# Patient Record
Sex: MALE | Race: WHITE | NOT HISPANIC OR LATINO | URBAN - METROPOLITAN AREA
[De-identification: names, ages, dates, MRNs, and addresses within clinical notes are randomized per-mention and may not be internally consistent; named-entity substitution may affect disease eponyms.]

---

## 2017-01-25 ENCOUNTER — OUTPATIENT (OUTPATIENT)
Dept: OUTPATIENT SERVICES | Facility: HOSPITAL | Age: 72
LOS: 1 days | End: 2017-01-25
Payer: COMMERCIAL

## 2017-01-25 PROCEDURE — C1769: CPT

## 2017-01-25 PROCEDURE — 50435 EXCHANGE NEPHROSTOMY CATH: CPT | Mod: RT

## 2017-01-25 PROCEDURE — C1729: CPT

## 2017-01-25 PROCEDURE — 50435 EXCHANGE NEPHROSTOMY CATH: CPT

## 2017-06-07 ENCOUNTER — OUTPATIENT (OUTPATIENT)
Dept: OUTPATIENT SERVICES | Facility: HOSPITAL | Age: 72
LOS: 1 days | End: 2017-06-07
Payer: COMMERCIAL

## 2017-06-07 PROCEDURE — 50435 EXCHANGE NEPHROSTOMY CATH: CPT | Mod: RT

## 2017-06-07 PROCEDURE — C1729: CPT

## 2017-06-07 PROCEDURE — 50435 EXCHANGE NEPHROSTOMY CATH: CPT

## 2017-06-07 PROCEDURE — C1769: CPT

## 2017-08-30 ENCOUNTER — OUTPATIENT (OUTPATIENT)
Dept: OUTPATIENT SERVICES | Facility: HOSPITAL | Age: 72
LOS: 1 days | End: 2017-08-30
Payer: COMMERCIAL

## 2017-08-30 PROCEDURE — C1769: CPT

## 2017-08-30 PROCEDURE — 50435 EXCHANGE NEPHROSTOMY CATH: CPT | Mod: RT

## 2017-08-30 PROCEDURE — C1729: CPT

## 2017-08-30 PROCEDURE — 50432 PLMT NEPHROSTOMY CATHETER: CPT

## 2017-11-28 ENCOUNTER — OUTPATIENT (OUTPATIENT)
Dept: OUTPATIENT SERVICES | Facility: HOSPITAL | Age: 72
LOS: 1 days | End: 2017-11-28
Payer: COMMERCIAL

## 2017-11-28 PROCEDURE — C1769: CPT

## 2017-11-28 PROCEDURE — 50435 EXCHANGE NEPHROSTOMY CATH: CPT | Mod: RT

## 2017-11-28 PROCEDURE — 50435 EXCHANGE NEPHROSTOMY CATH: CPT

## 2017-11-28 PROCEDURE — C1729: CPT

## 2017-12-06 DIAGNOSIS — N13.5 CROSSING VESSEL AND STRICTURE OF URETER WITHOUT HYDRONEPHROSIS: ICD-10-CM

## 2017-12-06 DIAGNOSIS — Z43.6 ENCOUNTER FOR ATTENTION TO OTHER ARTIFICIAL OPENINGS OF URINARY TRACT: ICD-10-CM

## 2018-04-03 ENCOUNTER — FORM ENCOUNTER (OUTPATIENT)
Age: 73
End: 2018-04-03

## 2018-04-04 ENCOUNTER — OUTPATIENT (OUTPATIENT)
Dept: OUTPATIENT SERVICES | Facility: HOSPITAL | Age: 73
LOS: 1 days | End: 2018-04-04
Payer: COMMERCIAL

## 2018-04-04 ENCOUNTER — APPOINTMENT (OUTPATIENT)
Dept: INTERVENTIONAL RADIOLOGY/VASCULAR | Facility: HOSPITAL | Age: 73
End: 2018-04-04
Payer: COMMERCIAL

## 2018-04-04 DIAGNOSIS — N13.5 CROSSING VESSEL AND STRICTURE OF URETER WITHOUT HYDRONEPHROSIS: ICD-10-CM

## 2018-04-04 DIAGNOSIS — Z43.6 ENCOUNTER FOR ATTENTION TO OTHER ARTIFICIAL OPENINGS OF URINARY TRACT: ICD-10-CM

## 2018-04-04 PROCEDURE — C1769: CPT

## 2018-04-04 PROCEDURE — 50435 EXCHANGE NEPHROSTOMY CATH: CPT

## 2018-04-04 PROCEDURE — 50435 EXCHANGE NEPHROSTOMY CATH: CPT | Mod: 50

## 2018-04-04 PROCEDURE — C1729: CPT

## 2018-06-27 ENCOUNTER — APPOINTMENT (OUTPATIENT)
Dept: INTERVENTIONAL RADIOLOGY/VASCULAR | Facility: HOSPITAL | Age: 73
End: 2018-06-27
Payer: COMMERCIAL

## 2018-06-27 ENCOUNTER — OUTPATIENT (OUTPATIENT)
Dept: OUTPATIENT SERVICES | Facility: HOSPITAL | Age: 73
LOS: 1 days | End: 2018-06-27
Payer: COMMERCIAL

## 2018-06-27 DIAGNOSIS — N13.5 CROSSING VESSEL AND STRICTURE OF URETER WITHOUT HYDRONEPHROSIS: ICD-10-CM

## 2018-06-27 DIAGNOSIS — Z43.6 ENCOUNTER FOR ATTENTION TO OTHER ARTIFICIAL OPENINGS OF URINARY TRACT: ICD-10-CM

## 2018-06-27 PROCEDURE — C1769: CPT

## 2018-06-27 PROCEDURE — C1729: CPT

## 2018-06-27 PROCEDURE — 50435 EXCHANGE NEPHROSTOMY CATH: CPT | Mod: RT

## 2018-06-27 PROCEDURE — 50435 EXCHANGE NEPHROSTOMY CATH: CPT

## 2018-09-04 ENCOUNTER — APPOINTMENT (OUTPATIENT)
Dept: INTERVENTIONAL RADIOLOGY/VASCULAR | Facility: HOSPITAL | Age: 73
End: 2018-09-04

## 2018-11-14 ENCOUNTER — APPOINTMENT (OUTPATIENT)
Dept: INTERVENTIONAL RADIOLOGY/VASCULAR | Facility: HOSPITAL | Age: 73
End: 2018-11-14

## 2018-11-14 ENCOUNTER — OUTPATIENT (OUTPATIENT)
Dept: OUTPATIENT SERVICES | Facility: HOSPITAL | Age: 73
LOS: 1 days | End: 2018-11-14
Payer: COMMERCIAL

## 2018-11-14 PROCEDURE — 50435 EXCHANGE NEPHROSTOMY CATH: CPT

## 2018-11-14 PROCEDURE — C1769: CPT

## 2018-11-14 PROCEDURE — C1729: CPT

## 2018-11-14 PROCEDURE — 50434 CONVERT NEPHROSTOMY CATHETER: CPT | Mod: RT

## 2019-02-06 ENCOUNTER — APPOINTMENT (OUTPATIENT)
Dept: INTERVENTIONAL RADIOLOGY/VASCULAR | Facility: HOSPITAL | Age: 74
End: 2019-02-06

## 2023-12-20 ENCOUNTER — HOSPITAL ENCOUNTER (INPATIENT)
Facility: HOSPITAL | Age: 78
LOS: 2 days | Discharge: HOME | DRG: 689 | End: 2023-12-23
Attending: EMERGENCY MEDICINE | Admitting: HOSPITALIST
Payer: COMMERCIAL

## 2023-12-20 ENCOUNTER — APPOINTMENT (EMERGENCY)
Dept: RADIOLOGY | Facility: HOSPITAL | Age: 78
DRG: 689 | End: 2023-12-20
Attending: EMERGENCY MEDICINE
Payer: COMMERCIAL

## 2023-12-20 DIAGNOSIS — U07.1 COVID-19: ICD-10-CM

## 2023-12-20 DIAGNOSIS — N39.0 LOWER URINARY TRACT INFECTION: Primary | ICD-10-CM

## 2023-12-20 PROBLEM — E11.9 TYPE 2 DIABETES MELLITUS WITHOUT COMPLICATION (CMS/HCC): Status: ACTIVE | Noted: 2023-12-20

## 2023-12-20 PROBLEM — C64.9 MALIGNANT NEOPLASM OF KIDNEY EXCLUDING RENAL PELVIS (CMS/HCC): Status: ACTIVE | Noted: 2023-12-20

## 2023-12-20 PROBLEM — E78.2 MIXED HYPERLIPIDEMIA: Status: ACTIVE | Noted: 2023-12-20

## 2023-12-20 LAB
ALBUMIN SERPL-MCNC: 3.9 G/DL (ref 3.5–5.7)
ALP SERPL-CCNC: 121 IU/L (ref 34–125)
ALT SERPL-CCNC: 9 IU/L (ref 7–52)
ANION GAP SERPL CALC-SCNC: 14 MEQ/L (ref 3–15)
AST SERPL-CCNC: 12 IU/L (ref 13–39)
ATRIAL RATE: 91
BACTERIA URNS QL MICRO: 3 /HPF
BASE EXCESS BLDV CALC-SCNC: -6.4 MEQ/L
BASOPHILS # BLD: 0.02 K/UL (ref 0.01–0.1)
BASOPHILS NFR BLD: 0.2 %
BILIRUB SERPL-MCNC: 0.8 MG/DL (ref 0.3–1.2)
BILIRUB UR QL STRIP.AUTO: NEGATIVE MG/DL
BUN SERPL-MCNC: 34 MG/DL (ref 7–25)
CA-I BLD-SCNC: 1.08 MMOL/L (ref 1.15–1.27)
CALCIUM SERPL-MCNC: 9.1 MG/DL (ref 8.6–10.3)
CHLORIDE BLDV-SCNC: 109 MEQ/L (ref 98–109)
CHLORIDE SERPL-SCNC: 104 MEQ/L (ref 98–107)
CLARITY UR REFRACT.AUTO: ABNORMAL
CO2 BLDV-SCNC: 20.2 MEQ/L (ref 22–32)
CO2 SERPL-SCNC: 20 MEQ/L (ref 21–31)
COHGB MFR BLD: 0.9 %
COLOR UR AUTO: ABNORMAL
CREAT SERPL-MCNC: 2.1 MG/DL (ref 0.7–1.3)
DIFFERENTIAL METHOD BLD: ABNORMAL
EGFRCR SERPLBLD CKD-EPI 2021: 31.4 ML/MIN/1.73M*2
EOSINOPHIL # BLD: 0.02 K/UL (ref 0.04–0.54)
EOSINOPHIL NFR BLD: 0.2 %
ERYTHROCYTE [DISTWIDTH] IN BLOOD BY AUTOMATED COUNT: 13.6 % (ref 11.6–14.4)
FIO2 ON VENT: ABNORMAL %
FLUAV RNA SPEC QL NAA+PROBE: NEGATIVE
FLUBV RNA SPEC QL NAA+PROBE: NEGATIVE
GLUCOSE BLD-MCNC: 131 MG/DL (ref 70–99)
GLUCOSE BLD-MCNC: 134 MG/DL (ref 70–99)
GLUCOSE BLD-MCNC: 169 MG/DL (ref 70–99)
GLUCOSE BLDV-MCNC: 150 MG/DL (ref 70–99)
GLUCOSE SERPL-MCNC: 181 MG/DL (ref 70–99)
GLUCOSE UR STRIP.AUTO-MCNC: NEGATIVE MG/DL
HCO3 BLDV-SCNC: 18.8 MEQ/L (ref 21–28)
HCT VFR BLDCO AUTO: 38.8 % (ref 40.1–51)
HGB BLD-MCNC: 12.2 G/DL (ref 13.7–17.5)
HGB BLDV-MCNC: 10.5 G/DL (ref 14–17.5)
HGB UR QL STRIP.AUTO: 1
HYALINE CASTS #/AREA URNS LPF: ABNORMAL /LPF
IMM GRANULOCYTES # BLD AUTO: 0.03 K/UL (ref 0–0.08)
IMM GRANULOCYTES NFR BLD AUTO: 0.4 %
INHALED O2 CONCENTRATION: ABNORMAL %
KETONES UR STRIP.AUTO-MCNC: NEGATIVE MG/DL
LACTATE SERPL-SCNC: 1.8 MMOL/L (ref 0.4–2)
LEUKOCYTE ESTERASE UR QL STRIP.AUTO: 3
LYMPHOCYTES # BLD: 0.44 K/UL (ref 1.2–3.5)
LYMPHOCYTES NFR BLD: 5.5 %
MCH RBC QN AUTO: 27 PG (ref 28–33.2)
MCHC RBC AUTO-ENTMCNC: 31.4 G/DL (ref 32.2–36.5)
MCV RBC AUTO: 85.8 FL (ref 83–98)
METHGB BLD-SCNC: 0 % (ref 0.4–1.5)
MONOCYTES # BLD: 0.79 K/UL (ref 0.3–1)
MONOCYTES NFR BLD: 9.8 %
NEUTROPHILS # BLD: 6.74 K/UL (ref 1.7–7)
NEUTS SEG NFR BLD: 83.9 %
NITRITE UR QL STRIP.AUTO: NEGATIVE
NRBC BLD-RTO: 0 %
P AXIS: 28
PCO2 BLDV: 37 MM HG (ref 41–51)
PDW BLD AUTO: 9.5 FL (ref 9.4–12.4)
PH BLDV: 7.32 [PH] (ref 7.32–7.42)
PH UR STRIP.AUTO: 6 [PH]
PLATELET # BLD AUTO: 211 K/UL (ref 150–350)
PO2 BLDV: 38 MM HG (ref 25–40)
POCT TEST: ABNORMAL
POTASSIUM BLDV-SCNC: 5.1 MEQ/L (ref 3.4–4.5)
POTASSIUM SERPL-SCNC: 4.3 MEQ/L (ref 3.5–5.1)
PR INTERVAL: 116
PROT SERPL-MCNC: 7.2 G/DL (ref 6–8.2)
PROT UR QL STRIP.AUTO: 1
QRS DURATION: 86
QT INTERVAL: 380
QTC CALCULATION(BAZETT): 467
R AXIS: 62
RBC # BLD AUTO: 4.52 M/UL (ref 4.5–5.8)
RBC #/AREA URNS HPF: ABNORMAL /HPF
RSV RNA SPEC QL NAA+PROBE: NEGATIVE
SAO2 % BLDV: 43 % (ref 30–60)
SARS-COV-2 RNA RESP QL NAA+PROBE: POSITIVE
SODIUM BLDV-SCNC: 137 MEQ/L (ref 136–145)
SODIUM SERPL-SCNC: 138 MEQ/L (ref 136–145)
SP GR UR REFRACT.AUTO: 1.01
SQUAMOUS URNS QL MICRO: ABNORMAL /HPF
T WAVE AXIS: 107
TROPONIN I SERPL HS-MCNC: 40.2 PG/ML
TROPONIN I SERPL HS-MCNC: 58.6 PG/ML
UROBILINOGEN UR STRIP-ACNC: 0.2 EU/DL
VENTRICULAR RATE: 91
WBC # BLD AUTO: 8.04 K/UL (ref 3.8–10.5)
WBC #/AREA URNS HPF: ABNORMAL /HPF

## 2023-12-20 PROCEDURE — 84484 ASSAY OF TROPONIN QUANT: CPT | Mod: 91

## 2023-12-20 PROCEDURE — 87040 BLOOD CULTURE FOR BACTERIA: CPT | Mod: 91 | Performed by: EMERGENCY MEDICINE

## 2023-12-20 PROCEDURE — 63600000 HC DRUGS/DETAIL CODE: Mod: JZ | Performed by: EMERGENCY MEDICINE

## 2023-12-20 PROCEDURE — G0378 HOSPITAL OBSERVATION PER HR: HCPCS

## 2023-12-20 PROCEDURE — 82803 BLOOD GASES ANY COMBINATION: CPT | Performed by: EMERGENCY MEDICINE

## 2023-12-20 PROCEDURE — 84484 ASSAY OF TROPONIN QUANT: CPT

## 2023-12-20 PROCEDURE — 80053 COMPREHEN METABOLIC PANEL: CPT

## 2023-12-20 PROCEDURE — 36415 COLL VENOUS BLD VENIPUNCTURE: CPT

## 2023-12-20 PROCEDURE — 71250 CT THORAX DX C-: CPT | Mod: MG

## 2023-12-20 PROCEDURE — 81001 URINALYSIS AUTO W/SCOPE: CPT

## 2023-12-20 PROCEDURE — 63700000 HC SELF-ADMINISTRABLE DRUG: Performed by: HOSPITALIST

## 2023-12-20 PROCEDURE — 85025 COMPLETE CBC W/AUTO DIFF WBC: CPT

## 2023-12-20 PROCEDURE — 87086 URINE CULTURE/COLONY COUNT: CPT

## 2023-12-20 PROCEDURE — 93010 ELECTROCARDIOGRAM REPORT: CPT | Performed by: INTERNAL MEDICINE

## 2023-12-20 PROCEDURE — 74176 CT ABD & PELVIS W/O CONTRAST: CPT | Mod: ME

## 2023-12-20 PROCEDURE — 87637 SARSCOV2&INF A&B&RSV AMP PRB: CPT | Performed by: EMERGENCY MEDICINE

## 2023-12-20 PROCEDURE — 99285 EMERGENCY DEPT VISIT HI MDM: CPT | Mod: 25

## 2023-12-20 PROCEDURE — 25800000 HC PHARMACY IV SOLUTIONS: Performed by: EMERGENCY MEDICINE

## 2023-12-20 PROCEDURE — 99223 1ST HOSP IP/OBS HIGH 75: CPT | Performed by: INTERNAL MEDICINE

## 2023-12-20 PROCEDURE — 96365 THER/PROPH/DIAG IV INF INIT: CPT

## 2023-12-20 PROCEDURE — 93005 ELECTROCARDIOGRAM TRACING: CPT

## 2023-12-20 RX ORDER — METFORMIN HYDROCHLORIDE 1000 MG/1
1000 TABLET ORAL 2 TIMES DAILY WITH MEALS
COMMUNITY

## 2023-12-20 RX ORDER — ASPIRIN 81 MG/1
81 TABLET ORAL DAILY
Status: DISCONTINUED | OUTPATIENT
Start: 2023-12-21 | End: 2023-12-23 | Stop reason: HOSPADM

## 2023-12-20 RX ORDER — ASPIRIN 81 MG/1
81 TABLET ORAL DAILY
COMMUNITY

## 2023-12-20 RX ORDER — CLOPIDOGREL BISULFATE 75 MG/1
75 TABLET ORAL DAILY
COMMUNITY

## 2023-12-20 RX ORDER — ONDANSETRON HYDROCHLORIDE 2 MG/ML
4 INJECTION, SOLUTION INTRAVENOUS EVERY 6 HOURS PRN
Status: DISCONTINUED | OUTPATIENT
Start: 2023-12-20 | End: 2023-12-23 | Stop reason: HOSPADM

## 2023-12-20 RX ORDER — SILODOSIN 8 MG/1
8 CAPSULE ORAL EVERY EVENING
COMMUNITY

## 2023-12-20 RX ORDER — CLOPIDOGREL BISULFATE 75 MG/1
75 TABLET ORAL DAILY
Status: DISCONTINUED | OUTPATIENT
Start: 2023-12-20 | End: 2023-12-23 | Stop reason: HOSPADM

## 2023-12-20 RX ORDER — ATORVASTATIN CALCIUM 20 MG/1
20 TABLET, FILM COATED ORAL
Status: DISCONTINUED | OUTPATIENT
Start: 2023-12-20 | End: 2023-12-23 | Stop reason: HOSPADM

## 2023-12-20 RX ORDER — DEXTROSE 40 %
15-30 GEL (GRAM) ORAL AS NEEDED
Status: DISCONTINUED | OUTPATIENT
Start: 2023-12-20 | End: 2023-12-23 | Stop reason: HOSPADM

## 2023-12-20 RX ORDER — ATORVASTATIN CALCIUM 40 MG/1
20 TABLET, FILM COATED ORAL NIGHTLY
COMMUNITY
End: 2023-12-23 | Stop reason: HOSPADM

## 2023-12-20 RX ORDER — DEXTROSE 50 % IN WATER (D50W) INTRAVENOUS SYRINGE
25 AS NEEDED
Status: DISCONTINUED | OUTPATIENT
Start: 2023-12-20 | End: 2023-12-23 | Stop reason: HOSPADM

## 2023-12-20 RX ORDER — INSULIN LISPRO 100 [IU]/ML
3-5 INJECTION, SOLUTION INTRAVENOUS; SUBCUTANEOUS
Status: DISCONTINUED | OUTPATIENT
Start: 2023-12-20 | End: 2023-12-23 | Stop reason: HOSPADM

## 2023-12-20 RX ORDER — ACETAMINOPHEN 325 MG/1
650 TABLET ORAL EVERY 4 HOURS PRN
Status: DISCONTINUED | OUTPATIENT
Start: 2023-12-20 | End: 2023-12-23 | Stop reason: HOSPADM

## 2023-12-20 RX ORDER — ENOXAPARIN SODIUM 100 MG/ML
40 INJECTION SUBCUTANEOUS
Status: DISCONTINUED | OUTPATIENT
Start: 2023-12-20 | End: 2023-12-21

## 2023-12-20 RX ORDER — GLYBURIDE 5 MG/1
10 TABLET ORAL 2 TIMES DAILY WITH MEALS
COMMUNITY

## 2023-12-20 RX ORDER — IBUPROFEN 200 MG
16-32 TABLET ORAL AS NEEDED
Status: DISCONTINUED | OUTPATIENT
Start: 2023-12-20 | End: 2023-12-23 | Stop reason: HOSPADM

## 2023-12-20 RX ADMIN — ATORVASTATIN CALCIUM 20 MG: 20 TABLET, FILM COATED ORAL at 19:09

## 2023-12-20 RX ADMIN — SODIUM CHLORIDE 1000 ML: 9 INJECTION, SOLUTION INTRAVENOUS at 15:30

## 2023-12-20 RX ADMIN — CLOPIDOGREL 75 MG: 75 TABLET ORAL at 19:09

## 2023-12-20 RX ADMIN — CEFTRIAXONE SODIUM 1 G: 1 INJECTION, POWDER, FOR SOLUTION INTRAMUSCULAR; INTRAVENOUS at 15:30

## 2023-12-20 ASSESSMENT — ENCOUNTER SYMPTOMS
SHORTNESS OF BREATH: 0
FEVER: 0

## 2023-12-20 NOTE — ED PROVIDER NOTES
Emergency Medicine Note  HPI   HISTORY OF PRESENT ILLNESS       History provided by:  Patient and spouse  Illness  Location:  Mild HA, nausea, cough, vomited once and weakness  Severity:  Moderate  Onset quality:  Gradual  Duration:  1 day  Timing:  Intermittent  Progression:  Unchanged  Chronicity:  New  Relieved by:  Nothing  Worsened by:  Nothing  Associated symptoms: no chest pain, no fever and no shortness of breath          Patient History   PAST HISTORY     Reviewed from Nursing Triage:       No past medical history on file.    No past surgical history on file.    No family history on file.           Review of Systems   REVIEW OF SYSTEMS     Review of Systems   Constitutional: Negative for fever.   Respiratory: Negative for shortness of breath.    Cardiovascular: Negative for chest pain.         VITALS     ED Vitals    None                      Physical Exam   PHYSICAL EXAM     Physical Exam  Vitals and nursing note reviewed.   Constitutional:       Appearance: He is well-developed.   HENT:      Head: Normocephalic and atraumatic.   Eyes:      General: No scleral icterus.     Conjunctiva/sclera: Conjunctivae normal.   Neck:      Vascular: No JVD.   Cardiovascular:      Rate and Rhythm: Normal rate and regular rhythm.   Pulmonary:      Effort: Pulmonary effort is normal. No respiratory distress.      Breath sounds: Normal breath sounds.   Abdominal:      Palpations: Abdomen is soft.      Tenderness: There is no abdominal tenderness.   Musculoskeletal:         General: No tenderness.      Cervical back: Neck supple.   Skin:     General: Skin is warm and dry.   Neurological:      Mental Status: He is alert.   Psychiatric:         Mood and Affect: Mood normal.         Behavior: Behavior normal.           PROCEDURES     Critical Care    Performed by: Cirilo Rivera MD  Authorized by: Cirilo Rivera MD    Critical care provider statement:     Critical care time (minutes):  35    Critical care time was  exclusive of:  Separately billable procedures and treating other patients    Critical care was time spent personally by me on the following activities:  Development of treatment plan with patient or surrogate, evaluation of patient's response to treatment, examination of patient, ordering and performing treatments and interventions, ordering and review of laboratory studies, ordering and review of radiographic studies, pulse oximetry, re-evaluation of patient's condition and review of old charts         DATA     Results     None          Imaging Results    None         No orders to display       Scoring tools                                  ED Course & MDM   MDM / ED COURSE / CLINICAL IMPRESSION / DISPO     Medical Decision Making  79yo male h/o DM, HTN, kidney CA s/p nephrectomy, CVA and prostate CA (on Lupron) brought in by wife for evaluation.  Last night, patient had mild headache cough and felt weak.  This morning, patient had tea and vomited once.  Wife reports decreased p.o. intake recently.  States last time patient had similar symptoms he had a urine infection.  Patient denies any urinary complaints currently.  Will check labs, COVID/flu, CXR, UA and reassess.    COVID-19: acute illness or injury  Lower urinary tract infection: acute illness or injury  Amount and/or Complexity of Data Reviewed  Independent Historian: spouse  External Data Reviewed: notes.     Details: 6/8/2022 visit for SAH s/p fall  [facility]    Labs: ordered. Decision-making details documented in ED Course.  Radiology: ordered and independent interpretation performed.  ECG/medicine tests: ordered and independent interpretation performed.     Details: NSR, nl axis, nonspecific ST-T changes      Risk  Decision regarding hospitalization.          ED Course as of 12/20/23 1543   Wed Dec 20, 2023   1444 UA concerning for UTI [MADDY]   1511 Cr 2.1 (last 1.45) [MADDY]   1533 Covid positive [MADDY]   1543 Case d/w Dr. Wall who will admit  [MADDY]      ED Course User Index  [MADDY] Cirilo Rivera MD     Clinical Impression      None               Cirilo Rivera MD  12/20/23 1546       Cirilo Rivera MD  12/20/23 1081

## 2023-12-20 NOTE — ASSESSMENT & PLAN NOTE
Patient with mild symptoms of nausea and lower extremity weakness. Incidentally found to be COVID-positive on admission. Continue isolation precautions per current guidelines.

## 2023-12-20 NOTE — PROGRESS NOTES
Spoke with patient's spouse and confirmed with medication list from [facility] and/or patient's own pharmacy to complete the medication reconciliation.     Prior to admission medication list:    Current Outpatient Medications:   •  aspirin 81 mg enteric coated tablet, Take 81 mg by mouth daily.  •  atorvastatin (LIPITOR) 40 mg tablet, Take 20 mg by mouth nightly.  •  clopidogreL (PLAVIX) 75 mg tablet, Take 75 mg by mouth daily.  •  glyBURIDE (DIABETA) 5 mg tablet, Take 10 mg by mouth 2 (two) times a day with meals.  •  metFORMIN (GLUCOPHAGE) 1,000 mg tablet, Take 1,000 mg by mouth 2 (two) times a day with meals.  •  silodosin (RAPAFLO) 8 mg capsule, Take 8 mg by mouth every evening.    Comments about home medications:  -Patient's spouse states patient is not taking Vitamin D2.    Compliance:   -Consistent fills, no compliance concerns based on patient interview

## 2023-12-20 NOTE — ASSESSMENT & PLAN NOTE
UA was generally positive with +3 bacteia. Ceftriaxone started in the ED and will continue 1 g daily. CT of the abdomen pelvis shows solitary right kidney with hydronephrosis.  Admitting team reviewed his nephrogram from March and findings were similar.  By report at that time he had hydronephrosis and 2 nonobstructing stones.  Stones on this imaging were absent. Treated with CTX while inpatient. Will DC on cepodoxime to complete 10d course.

## 2023-12-20 NOTE — H&P
Hospital Medicine Service -  History & Physical        CHIEF COMPLAINT   Altered mental status     HISTORY OF PRESENT ILLNESS      John Valle is a 78 y.o. male with a past medical history of multiple CVA, SDH, DM 2, HTN, renal CA s/p left nephrectomy, and prostate CA who presents with change in mental status.  Patient and his wife are visiting from [place] which she noticed patient having difficulty ambulating and more confused on Sunday.  At that time patient felt some chills with questionable fevers.  They noticed increasing weakness on Monday and Tuesday and an episode of vomiting today with headache.  Patient is generally continent and wife noticed possible increase in frequency and changing depends garments.  Patient is currently oriented x 2 and was unable to provide history.  HPI was obtained by patient's wife, Cori, who is bedside for exam.  Patient currently denies any abdominal pain, dysuria, frequency, dizziness, or lightheadedness.  Denies any chest pain or shortness of breath.  Does not appear to be hypoxic and denies orthopnea.  Has not noticed any change in bowel habits.  Vomitus this a.m. was negative for blood or coffee-ground emesis.    Upon exam in the ED, patient is confused but pleasantly interactive.  BP is slightly elevated to 168/70.  He is currently afebrile.  CT of the chest abdomen pelvis was obtained which showed hydronephrosis of his remaining right kidney with possible scarring.  I reviewed imaging report from nephrogram patient obtained in March of this year which showed similar findings of hydronephrosis which were considered surgical changes.    Patient denies any smoking history, reports rare alcohol use, denies illicit drug use.  Confirmed CODE STATUS the patient is full code.    PAST MEDICAL AND SURGICAL HISTORY      No past medical history on file.    No past surgical history on file.    PCP: Chaz Maria MD    MEDICATIONS      Prior to Admission  medications    Not on File       ALLERGIES      Lisinopril    FAMILY HISTORY      No family history on file.    SOCIAL HISTORY      Social History     Socioeconomic History   • Marital status:      Social Determinants of Health     Food Insecurity: No Food Insecurity (12/20/2023)    Hunger Vital Sign    • Worried About Running Out of Food in the Last Year: Never true    • Ran Out of Food in the Last Year: Never true       REVIEW OF SYSTEMS      All other systems reviewed and negative except as noted in HPI    PHYSICAL EXAMINATION      Temp:  [36.8 °C (98.3 °F)] 36.8 °C (98.3 °F)  Heart Rate:  [82-91] 82  Resp:  [20-22] 20  BP: (158-199)/(73-83) 162/74  There is no height or weight on file to calculate BMI.    Physical Exam  Vitals and nursing note reviewed.   Constitutional:       General: He is awake.      Appearance: Normal appearance. He is well-developed and normal weight.   HENT:      Head: Normocephalic and atraumatic.      Mouth/Throat:      Mouth: Mucous membranes are moist.      Pharynx: Oropharynx is clear.   Eyes:      Extraocular Movements: Extraocular movements intact.      Conjunctiva/sclera: Conjunctivae normal.      Pupils: Pupils are equal, round, and reactive to light.   Cardiovascular:      Rate and Rhythm: Normal rate and regular rhythm.      Pulses: Normal pulses.      Heart sounds: Normal heart sounds, S1 normal and S2 normal.   Pulmonary:      Effort: Pulmonary effort is normal. No respiratory distress.      Breath sounds: Normal breath sounds.   Abdominal:      General: Abdomen is flat. Bowel sounds are normal.      Palpations: Abdomen is soft.      Tenderness: There is no abdominal tenderness.   Musculoskeletal:      Cervical back: Normal range of motion.   Neurological:      Mental Status: He is alert and easily aroused. He is disoriented.      GCS: GCS eye subscore is 4. GCS verbal subscore is 5. GCS motor subscore is 6.      Motor: Weakness present.   Psychiatric:         Attention  and Perception: Attention and perception normal.         Mood and Affect: Mood and affect normal.         Speech: Speech normal.         Behavior: Behavior normal. Behavior is cooperative.         Thought Content: Thought content normal.         Cognition and Memory: Cognition normal. Memory is impaired.         Judgment: Judgment normal.         LABS / IMAGING / EKG        Labs  Results from last 7 days   Lab Units 12/20/23  1359   SODIUM mEQ/L 138   POTASSIUM mEQ/L 4.3   CHLORIDE mEQ/L 104   CO2 mEQ/L 20*   BUN mg/dL 34*   CREATININE mg/dL 2.1*   CALCIUM mg/dL 9.1   ALBUMIN g/dL 3.9   BILIRUBIN TOTAL mg/dL 0.8   ALK PHOS IU/L 121   ALT IU/L 9   AST IU/L 12*   GLUCOSE mg/dL 181*     Results from last 7 days   Lab Units 12/20/23  1359   WBC K/uL 8.04   HEMOGLOBIN g/dL 12.2*   HEMATOCRIT % 38.8*   PLATELETS K/uL 211         Imaging  CT CHEST WITHOUT IV CONTRAST, CT ABDOMEN PELVIS WITHOUT IV CONTRAST    Result Date: 12/20/2023  Narrative: EXAM: CT CHEST WO IV CONTRAST, CT ABDOMEN PELVIS WO IV CONTRAST CLINICAL HISTORY: Cough, persistent. Flank pain. Kidney stone suspected. Streaky of solitary kidney. COMPARISON: None TECHNIQUE: CT chest, abdomen and pelvis without contrast was performed with the patient in the supine position. Images reconstructed/reformatted in the axial, coronal and  sagittal planes. Oral contrast was not administered. CT DOSE:  One or more dose reduction techniques (e.g. automated exposure control, adjustment of the mA and/or kV according to patient size, use of iterative reconstruction technique) utilized for this examination. . CONTRAST: None COMMENT: The lack of intravenous contrast limits interpretation of the solid organs, vessels and certain processes. CHEST AIRWAYS, LUNGS AND PLEURA: Patent central airways. Scattered areas of endobronchial mucous plugging in the peripheral airways, such as in the middle lobe on series 2 image 207 and right lower lobe on series 2 image 193. Mild biapical  pleural parenchymal scarring. Consolidation. No pleural effusion or pneumothorax. Minimal subsegmental atelectasis at the lung bases. CARDIOMEDIASTINUM: Normal heart size. Multivessel coronary athersclerosis. Aortic annular and valvular calcification. Trace pericardial fluid. AORTA, GREAT VESSELS: Atherosclerotic calcifications without aneurysm. LYMPH NODES: No thoracic lymphadenopathy IMAGED THYROID: Within normal limits IMAGED ESOPHAGUS: Within normal limits. CHEST WALL: Bilateral gynecomastia. ABDOMEN AND PELVIS LIVER: Normal in size and configuration. Scattered hepatic cysts and additional subcentimeter low attenuation lesions that are too small to characterize. Representative cyst in the right dome measures 1.6 cm on series 1 image 80. BILIARY TREE: No intrahepatic or extrahepatic bile duct dilation. GALLBLADDER: Cholecystectomy. PANCREAS: Within normal limits. SPLEEN: Lobular contour which may be related to scarring from prior infarct but nonspecific. Low-attenuation lesion in the lower pole of the spleen measuring 1.7 cm cannot be further characterized here. Most incidental splenic lesions are nonaggressive, including pseudocyst, lymphangioma or hemangioma. Correlation can be made with prior imaging or attention on follow-up. ADRENAL GLANDS: Within normal limits. KIDNEYS, URETERS: Left nephrectomy. 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 Severe right-sided hydronephrosis with caliber change at the expected location of the ureteropelvic junction. Distal ureter is mildly dilated, without stones seen. Right kidney is tethered posteriorly toward the posterior pararenal fascia with mild soft tissue thickening, clips and calcification. This may be related to scarring from prior surgery and/or nephrostomy tube placement. No suspicious renal mass. BOWEL: No disproportionate dilation of the small or large bowel. Appendix is unremarkable. Moderately large colonic stool burden.  PERITONEUM/RETROPERITONEUM:  No fluid collection, ascites, or pneumoperitoneum. LYMPH NODES: No abdominal or pelvic lymphadenopathy. REPRODUCTIVE ORGANS: Somewhat small prostate with coarse dystrophic calcification centrally. BLADDER: Mild wall thickening and trabeculation. VESSELS: Aortic and branch vessel atherosclerosis without aneurysm. BONES: No suspicious osseous lesion. SOFT TISSUES: Within normal limits.     Impression: IMPRESSION: Hydronephrotic right kidney which is tethered posteriorly to the posterior pararenal fascia where there is scarring and soft tissue thickening that may be related to prior surgery and/or nephrostomy tube placement.  Caliber change is likely at the ureteropelvic junction which is likely distorted and narrowed--potentially related to stricturing from prior surgery or inflammation but not well evaluated on this unenhanced study. Nephrostogram (either via a percutaneous antegrade or retrograde approach) may be helpful for assessment. Left nephrectomy with multiple clips in the retroperitoneum. Dilated distal left ureteral segment. No small bowel obstruction. Large colonic stool burden. No acute disease seen in the chest. Scattered areas of endobronchial mucous plugging noted without focal consolidation. Severe coronary atherosclerosis. Additional findings as above.         ECG/Telemetry  I have independently reviewed the telemetry. No events for the last 24 hours.    ASSESSMENT AND PLAN           * Complicated UTI (urinary tract infection)  Assessment & Plan  UA was generally positive with +3 bacteria  Ceftriaxone started in the ED and will continue 1 g daily  Follow urine cultures  CT of the abdomen pelvis shows solitary right kidney with hydronephrosis.  I reviewed his nephrogram from March and findings were similar.  At that time he had hydronephrosis and 2 nonobstructing stones.  Stones on this imaging were absent.  Will give 1 L IV fluids and continue to trend BMP daily  Avoid  nephrotoxic medications    Stroke (CMS/[facility])  Assessment & Plan  Continue ASA 81 mg daily  Clopidogrel 75 mg daily  Residual vision changes and speech difficulty  PT/OT consulted    Diabetes mellitus (CMS/[facility])  Assessment & Plan  Hold oral hypoglycemics while in house  Sliding-scale insulin with meals and nightly  Diabetic diet    COVID  Assessment & Plan  Patient with mild symptoms of nausea and lower extremity weakness  Incidentally found to be COVID-positive on admission  Supportive medication if needed  Not hypoxic.  Can hold on ID consult, steroids, remdesivir    Hypertension  Assessment & Plan  Patient is not currently taking any medications as BP is usually within normal limits  Consider further management if BP remains elevated    Mixed hyperlipidemia  Assessment & Plan  Continue atorvastatin 20 mg nightly       VTE Assessment: Padua VTE Score: 5  VTE Prophylaxis: Current anticoagulants:    •None      Code Status: No Order  Palliative Care Screening Score: 1   Discussed advanced care planning. John has an ACP and John's surrogate decision maker is Cori Valle (spouse).  Estimated Discharge Date: 12/22/2023  Disposition Planning: D/C home when medically stable.      JUHI Murillo  12/20/2023

## 2023-12-20 NOTE — ASSESSMENT & PLAN NOTE
Patient is not currently taking any medications as BP is usually within normal limits  some low and high readings over the last 24 hours, in the setting of illness, DARBY and IVF  monitor blood pressures, reasonable to consider starting a CCB if remains markedly elevated

## 2023-12-21 PROBLEM — N39.0 LOWER URINARY TRACT INFECTION: Status: ACTIVE | Noted: 2023-12-21

## 2023-12-21 PROBLEM — N17.9 AKI (ACUTE KIDNEY INJURY) (CMS/HCC): Status: ACTIVE | Noted: 2023-12-21

## 2023-12-21 LAB
ANION GAP SERPL CALC-SCNC: 11 MEQ/L (ref 3–15)
BUN SERPL-MCNC: 31 MG/DL (ref 7–25)
CALCIUM SERPL-MCNC: 8 MG/DL (ref 8.6–10.3)
CHLORIDE SERPL-SCNC: 105 MEQ/L (ref 98–107)
CO2 SERPL-SCNC: 20 MEQ/L (ref 21–31)
CREAT SERPL-MCNC: 2 MG/DL (ref 0.7–1.3)
EGFRCR SERPLBLD CKD-EPI 2021: 33.3 ML/MIN/1.73M*2
ERYTHROCYTE [DISTWIDTH] IN BLOOD BY AUTOMATED COUNT: 13.8 % (ref 11.6–14.4)
GLUCOSE BLD-MCNC: 106 MG/DL (ref 70–99)
GLUCOSE BLD-MCNC: 142 MG/DL (ref 70–99)
GLUCOSE BLD-MCNC: 255 MG/DL (ref 70–99)
GLUCOSE BLD-MCNC: 65 MG/DL (ref 70–99)
GLUCOSE BLD-MCNC: 85 MG/DL (ref 70–99)
GLUCOSE BLD-MCNC: 99 MG/DL (ref 70–99)
GLUCOSE SERPL-MCNC: 266 MG/DL (ref 70–99)
HCT VFR BLDCO AUTO: 34.4 % (ref 40.1–51)
HGB BLD-MCNC: 10.7 G/DL (ref 13.7–17.5)
MCH RBC QN AUTO: 26.9 PG (ref 28–33.2)
MCHC RBC AUTO-ENTMCNC: 31.1 G/DL (ref 32.2–36.5)
MCV RBC AUTO: 86.4 FL (ref 83–98)
PDW BLD AUTO: 9.5 FL (ref 9.4–12.4)
PLATELET # BLD AUTO: 176 K/UL (ref 150–350)
POCT TEST: ABNORMAL
POCT TEST: NORMAL
POCT TEST: NORMAL
POTASSIUM SERPL-SCNC: 4 MEQ/L (ref 3.5–5.1)
RBC # BLD AUTO: 3.98 M/UL (ref 4.5–5.8)
SODIUM SERPL-SCNC: 136 MEQ/L (ref 136–145)
WBC # BLD AUTO: 5.32 K/UL (ref 3.8–10.5)

## 2023-12-21 PROCEDURE — 80048 BASIC METABOLIC PNL TOTAL CA: CPT | Performed by: HOSPITALIST

## 2023-12-21 PROCEDURE — 99233 SBSQ HOSP IP/OBS HIGH 50: CPT | Performed by: HOSPITALIST

## 2023-12-21 PROCEDURE — 96361 HYDRATE IV INFUSION ADD-ON: CPT | Performed by: INTERNAL MEDICINE

## 2023-12-21 PROCEDURE — 97116 GAIT TRAINING THERAPY: CPT | Mod: GP

## 2023-12-21 PROCEDURE — 36415 COLL VENOUS BLD VENIPUNCTURE: CPT | Performed by: HOSPITALIST

## 2023-12-21 PROCEDURE — 63600000 HC DRUGS/DETAIL CODE: Mod: JZ | Performed by: HOSPITALIST

## 2023-12-21 PROCEDURE — G0378 HOSPITAL OBSERVATION PER HR: HCPCS

## 2023-12-21 PROCEDURE — 97530 THERAPEUTIC ACTIVITIES: CPT | Mod: GP

## 2023-12-21 PROCEDURE — 200200 PR NO CHARGE: Performed by: HOSPITALIST

## 2023-12-21 PROCEDURE — 25800000 HC PHARMACY IV SOLUTIONS: Performed by: HOSPITALIST

## 2023-12-21 PROCEDURE — 97162 PT EVAL MOD COMPLEX 30 MIN: CPT | Mod: GP

## 2023-12-21 PROCEDURE — 63700000 HC SELF-ADMINISTRABLE DRUG: Performed by: HOSPITALIST

## 2023-12-21 PROCEDURE — 85027 COMPLETE CBC AUTOMATED: CPT | Performed by: HOSPITALIST

## 2023-12-21 PROCEDURE — 12000000 HC ROOM AND CARE MED/SURG

## 2023-12-21 PROCEDURE — 96365 THER/PROPH/DIAG IV INF INIT: CPT | Mod: 59 | Performed by: INTERNAL MEDICINE

## 2023-12-21 RX ORDER — ENOXAPARIN SODIUM 100 MG/ML
30 INJECTION SUBCUTANEOUS
Status: DISCONTINUED | OUTPATIENT
Start: 2023-12-21 | End: 2023-12-23 | Stop reason: HOSPADM

## 2023-12-21 RX ORDER — SODIUM CHLORIDE, SODIUM LACTATE, POTASSIUM CHLORIDE, CALCIUM CHLORIDE 600; 310; 30; 20 MG/100ML; MG/100ML; MG/100ML; MG/100ML
INJECTION, SOLUTION INTRAVENOUS CONTINUOUS
Status: DISCONTINUED | OUTPATIENT
Start: 2023-12-21 | End: 2023-12-22

## 2023-12-21 RX ADMIN — ASPIRIN 81 MG: 81 TABLET, COATED ORAL at 08:08

## 2023-12-21 RX ADMIN — ENOXAPARIN SODIUM 30 MG: 30 INJECTION SUBCUTANEOUS at 16:50

## 2023-12-21 RX ADMIN — SODIUM CHLORIDE, POTASSIUM CHLORIDE, SODIUM LACTATE AND CALCIUM CHLORIDE 500 ML: 600; 310; 30; 20 INJECTION, SOLUTION INTRAVENOUS at 09:19

## 2023-12-21 RX ADMIN — SODIUM CHLORIDE, POTASSIUM CHLORIDE, SODIUM LACTATE AND CALCIUM CHLORIDE: 600; 310; 30; 20 INJECTION, SOLUTION INTRAVENOUS at 16:49

## 2023-12-21 RX ADMIN — ATORVASTATIN CALCIUM 20 MG: 20 TABLET, FILM COATED ORAL at 16:50

## 2023-12-21 RX ADMIN — CEFTRIAXONE SODIUM 1 G: 1 INJECTION, POWDER, FOR SOLUTION INTRAMUSCULAR; INTRAVENOUS at 11:22

## 2023-12-21 RX ADMIN — CLOPIDOGREL 75 MG: 75 TABLET ORAL at 08:08

## 2023-12-21 RX ADMIN — ACETAMINOPHEN 650 MG: 325 TABLET ORAL at 06:34

## 2023-12-21 RX ADMIN — SODIUM CHLORIDE, POTASSIUM CHLORIDE, SODIUM LACTATE AND CALCIUM CHLORIDE: 600; 310; 30; 20 INJECTION, SOLUTION INTRAVENOUS at 10:36

## 2023-12-21 ASSESSMENT — COGNITIVE AND FUNCTIONAL STATUS - GENERAL
WALKING IN HOSPITAL ROOM: 2 - A LOT
CLIMB 3 TO 5 STEPS WITH RAILING: 3 - A LITTLE
AFFECT: CONFUSED;WFL
STANDING UP FROM CHAIR USING ARMS: 2 - A LOT
MOVING TO AND FROM BED TO CHAIR: 3 - A LITTLE
STANDING UP FROM CHAIR USING ARMS: 3 - A LITTLE
CLIMB 3 TO 5 STEPS WITH RAILING: 2 - A LOT
MOVING TO AND FROM BED TO CHAIR: 2 - A LOT
WALKING IN HOSPITAL ROOM: 3 - A LITTLE

## 2023-12-21 NOTE — PLAN OF CARE
Care Coordination Admission Assessment Note    General Information:  Readmission Within the last 30 days: no previous admission in last 30 days  Does patient have a : No  Patient-Specific Goals (include timeframe): to go home    Living Arrangements:  Arrived From: home  Current Living Arrangements: home  People in Home: spouse  Home Accessibility: stairs within home (Group), stairs to enter home (Group)  Living Arrangement Comments: pt lives in 2  in [facility] with his wife, 3 [address] inside.    Housing Stability and Utility Access (SDOH):  In the last 12 months, was there a time when you were not able to pay the mortgage or rent on time?: No  In the last 12 months, how many places have you lived?:    In the last 12 months, was there a time when you did not have a steady place to sleep or slept in a shelter (including now)?: No  In the past 12 months has the electric, gas, oil, or water company threatened to shut off services in your home?: No    Functional Status Prior to Admission:   Assistive Device/Animal Currently Used at Home: shower chair, walker, front-wheeled (rollator)  Functional Status Comments: some assistance with bathing and dressing. wife does cooking cleaning shopping,  IADL Comments: independent in some IADLS     Supports and Services:  Current Outpatient/Agency/Support Group: none  Type of Current Home Care Services: home PT  History of home care episode or rehab stay: pt has private PT twice a week for an hour, and has had no rehab stays.    Discharge Needs Assessment:   Concerns to be Addressed: discharge planning  Current Discharge Risk:    Anticipated Changes Related to Illness: none    Patient/Family Anticipated Discharge Plan:  Patient/Family Anticipates Transition To: skilled nursing facility, family members' home  Patient/Family Anticipated Services at Transition: none    Connection to Community  Not applicable      Patient Choice:   Offered/Gave Vendor List:     Patient's Choice of Community Agency(s):         Anticipated Discharge Plan:  Met with patient. Provided education and contact information for Care Coordination services.: yes  Anticipated Discharge Disposition: family member's home with services, skilled nursing facility     Transportation Needs (SDOH):  Transportation Concerns: none  Transportation Anticipated: other (see comments)  Is Out of Hospital DNR needed at discharge?: no    In the past 12 months, has lack of transportation kept you from medical appointments or from getting medications?: No  In the past 12 months, has lack of transportation kept you from meetings, work, or from getting things needed for daily living?: No    Concerns - comments: SW completed assessment with pts wife ,Cori @  [phone]. Cori confirmed address, pharmacy, and PCP. pt is COVID-19 vaccinated x5.   SW will continue to follow for emotional support & d/c planning. [address].

## 2023-12-21 NOTE — PROGRESS NOTES
Physical Therapy -  Initial Evaluation     Patient: John Valle  Location: [place] [address]  MRN: [id number]  Today's date: 12/21/2023     Spoke with RN prior to treatment, no contradictions noted.     Patient left upright in bed with VSS, call bell and personal items within reach; RN aware.     HISTORY OF PRESENT ILLNESS     John is a 78 y.o. male admitted on 12/20/2023 with Lower urinary tract infection [N39.0]  Complicated UTI (urinary tract infection) [N39.0]  COVID-19 [U07.1]. Principal problem is Complicated UTI (urinary tract infection).    Past Medical History  John has past medical history of DM, hypertension, CVA, renal cancer status post left nephrectomy and prostate cancer.    History of Present Illness   Presents with UTI and found to COVID-positive.    PRIOR LEVEL OF FUNCTION AND LIVING ENVIRONMENT     Prior Level of Function    Flowsheet Row Most Recent Value   Ambulation assistive equipment   Transferring assistive equipment   Toileting assistive equipment   Bathing assistive person   Dressing assistive person   IADLs independent   Driving/Transportation friends/family   Communication understands/communicates without difficulty   Prior Level of Function Comment Patient modified independent with rollator/4WW. His wife assists with stair climbing. Patient receives assist as needed for ADLs. He has private pay PT 2x/week in [place]. Currently visiting his daughter for the holidays.   Assistive Device Currently Used at Home shower chair, walker, front-wheeled, walker, 4-wheeled, grab bar, raised toilet        Prior Living Environment    Flowsheet Row Most Recent Value   People in Home spouse   Current Living Arrangements home   Home Accessibility stairs within home (Group), stairs to enter home (Group)   Living Environment Comment Patient lives with his wife in a 2 story home with 3 MARGARETH and full flight with B HR to 2nd floor. Walk in shower with SC and GB.  Patient lives in [place],  visiting daughter in [place] who lives in a 2 story home with 4 MARGARETH and full flight with unilateral HR to 2nd floor bed/bath.        VITALS AND PAIN     PT Vitals    Date/Time Pulse HR Source SpO2 Pt Activity O2 Therapy BP BP Location BP Method Pt Position [facility]   12/21/23 1505 65 Monitor 98 % At rest None (Room air) 133/65 Right upper arm Automatic Lying ES   12/21/23 1530 70 Monitor 96 % At rest None (Room air) 131/94 Right upper arm Automatic Lying ES      PT Pain    Date/Time Pain Type Rating: Rest Rating: Activity [facility]   12/21/23 1505 Pain Assessment 0 0 ES           Objective   OBJECTIVE     Start time:  1503  End time:  1550  Session Length: 47 min  Mode of Treatment: individual therapy, physical therapy    General Observations  Patient received reclined, in bed. He was agreeable to therapy, no issues or concerns identified by nurse prior to session. Awake and alert. Wife at bedside.    Precautions: fall, enhanced contact and droplet (COVID 19+)       Limitations/Impairments: safety/cognitive      PT Eval and Treat - 12/21/23 1503        Cognition    Orientation Status oriented to;person;place     Affect/Mental Status confused;WFL   confused at times    Follows Commands follows one-step commands;physical/tactile prompts required;verbal cues/prompting required;repetition of directions required;increased processing time needed     Cognitive Function executive function deficit;safety deficit     Comment, Cognition Patient oriented to self and place, stated 2023 as the year. Wife reports that she often reminds him of the date at his baseline. Mild confusion noted. Patient receptive to education and motivated to participate in therapy. Patient follows 1 step commands with increased time and repetition of cues provided.        Sensory Assessment    Sensory Assessment sensation intact, lower extremities   reports neuropathy in B feet at times, no numbness/tingling currently       Lower  Extremity Assessment    LE Assessment ROM and Strength WFL        Motor Skills    Coordination WFL;lower extremity        Bed Mobility    Bed Mobility Activities supine to sit;sit to supine     [place] minimum assist (75% or more patient effort);1 person assist;close supervision     Safety/Cues increased time to complete;hand placement;sequencing;technique     Assistive Device bed rails;head of bed elevated     Comment Patient sat up to left EOB with increased time and effort, min assist at trunk provided. Patient reports his wife helps him out of bed at home. Patient returned to supine with close supervision and cues for sequencing.        Mobility Belt    Mobility Belt Used for All Out of Bed Activity yes        Sit/Stand Transfer    Surface edge of bed     [place] close supervision     Safety/Cues hand placement;increased time to complete;technique;sequencing     Assistive Device walker, front-wheeled     Transfer Comments Cues for hand placement and upright posture. Slow to rise.        Gait Training    [place], Gait close supervision;minimum assist (75% or more patient effort);1 person assist     Safety/Cues increased time to complete;hand placement;sequencing;technique     Assistive Device walker, front-wheeled     Distance in Feet 50 feet     Pattern step-through     Deviations/Abnormal Patterns gait speed decreased;step length decreased     Comment (Gait/Stairs) Patient with slow pace and decreased step length. Patient required cues to avoid obstacles in room throughout mobility. No dizziness or LOB noted.        Stairs Training    [place], Stairs not tested        Balance    Static Sitting Balance WFL;sitting, edge of bed     Dynamic Sitting Balance WFL;sitting, edge of bed     Sit to Stand Dynamic Balance mild impairment;supported     Static Standing Balance supported;mild impairment     Dynamic Standing Balance mild impairment;supported     Comment, Balance Benefits from RW. No LOB.         Impairments/Safety Issues    Impairments Affecting Function balance;endurance/activity tolerance;strength     Safety Issues Affecting Function insight into deficits/self-awareness;positioning of assistive device;safety precautions follow-through/compliance;sequencing abilities                               Education Documentation  Home Safety, taught by Gracie Zazueta PT at 12/21/2023  4:19 PM.  Learner: Significant Other, Patient  Readiness: Acceptance  Method: Explanation  Response: Verbalizes Understanding, Needs Reinforcement  Comment: Patient educated on PT plan of care, pacing with activity, and safety with mobility.    Energy Conservation, taught by Gracie Zazueta PT at 12/21/2023  4:19 PM.  Learner: Significant Other, Patient  Readiness: Acceptance  Method: Explanation  Response: Verbalizes Understanding, Needs Reinforcement  Comment: Patient educated on PT plan of care, pacing with activity, and safety with mobility.    Assistive/Adaptive Devices, taught by Gracie Zazueta PT at 12/21/2023  4:19 PM.  Learner: Significant Other, Patient  Readiness: Acceptance  Method: Explanation  Response: Verbalizes Understanding, Needs Reinforcement  Comment: Patient educated on PT plan of care, pacing with activity, and safety with mobility.        Session Outcome  Patient upright, in bed at end of session, all needs met, personal items in reach, call light in reach, bed alarm on. Nursing notified about change in vital signs, patient's response to therapy/activity, patient's performance, and patient's position.    AM-PAC™ - Mobility (Current Function)     Turning form your back to your side while in flat bed without using bedrails 3 - A Little   Moving from lying on your back to sitting on the side of a flat bed without using bedrails 3 - A Little   Moving to and from a bed to a chair 3 - A Little   Standing up from a chair using your arms 3 - A Little   To walk in a hospital room 3 - A Little   Climbing 3-5  steps with a railing 3 - A Little   AM-PAC™ Mobility Score 18      ASSESSMENT AND PLAN     Progress Summary  PT eval completed. Patient required close supervision to min A for all functional mobility. No LOB or dizziness noted. Patient required cues to avoid obstacles in room. Patient has supportive family who assist with mobility and ADLs at baseline. Recommend resuming home health PT once back in [place]. No DME needs noted. Recommend home with family at discharge.    Patient/Family Therapy Goals Statement: to go home    PT Plan    Flowsheet Row Most Recent Value   Rehab Potential good, to achieve stated therapy goals at 12/21/2023 1503   Therapy Frequency 4 times/wk at 12/21/2023 1503   Planned Therapy Interventions gait training, bed mobility training, balance training, transfer training, strengthening, stair training at 12/21/2023 1503          PT Discharge Recommendations    Flowsheet Row Most Recent Value   PT Recommended Discharge Disposition home with assistance at 12/21/2023 1503   Anticipated Equipment Needs if Discharged Home (PT) none at 12/21/2023 1503               PT Goals    Flowsheet Row Most Recent Value   Bed Mobility Goal 1    Activity/Assistive Device sit to supine/supine to sit at 12/21/2023 1503   [place] modified independence at 12/21/2023 1503   Time Frame by discharge at 12/21/2023 1503   Progress/Outcome goal ongoing at 12/21/2023 1503   Transfer Goal 1    Activity/Assistive Device sit-to-stand/stand-to-sit, bed-to-chair/chair-to-bed at 12/21/2023 1503   [place] modified independence at 12/21/2023 1503   Time Frame by discharge at 12/21/2023 1503   Progress/Outcome goal ongoing at 12/21/2023 1503   Gait Training Goal 1    Activity/Assistive Device gait (walking locomotion), assistive device use at 12/21/2023 1503   [place] modified independence at 12/21/2023 1503   Distance 150 at 12/21/2023 1503   Time Frame by discharge at 12/21/2023 1503   Progress/Outcome goal  ongoing at 12/21/2023 1503   Stairs Goal 1    Activity/Assistive Device ascending stairs, descending stairs at 12/21/2023 1503   [place] supervision required at 12/21/2023 1503   Number of Stairs 4 at 12/21/2023 1503   Time Frame by discharge at 12/21/2023 1503   Progress/Outcome goal ongoing at 12/21/2023 1503

## 2023-12-21 NOTE — ASSESSMENT & PLAN NOTE
"Patient's wife has access to his medical records, \"Creatinine was 1.73 on aug 30, 2023.  In March of this year, he also had a UTI and his creatinine elevated to 2.19 but when treated at the hospital, it lowered to 1.79 and 1.86 consecutively.\"    p/w DARBY in the setting of COVID19 and UTI/hydronephrosis  - now back to near baseline  - dc IVF and monitor  "

## 2023-12-21 NOTE — PLAN OF CARE
Problem: Adult Inpatient Plan of Care  Goal: Plan of Care Review  Outcome: Progressing  Flowsheets (Taken 12/21/2023 8681)  Progress: improving  Outcome Evaluation: PT eval completed. Patient required close supervision to min A for all functional mobility. No LOB or dizziness noted. Patient required cues to avoid obstacles in room. Patient has supportive family who assist with mobility and ADLs at baseline. Recommend resuming home health PT once back in [place]. No DME needs noted. Recommend home with family at discharge.  Plan of Care Reviewed With: patient

## 2023-12-21 NOTE — CONSULTS
Urology Consult    Subjective     Mr. Valle is a 78 year-old male with a  history as follows:    - Prostate cancer status post pelvic radiation in the 1990s, now with metachronous metastasis on ADT and second generation anti-androgen   - Bilateral renal masses status post left radical nephrectomy in 1990s and right partial nephrectomy in 2009  - He had a long-term indwelling right percutaneous nephrostomy for unclear reasons, this was removed 2 years ago and he has been voiding normally with some urinary frequency    Patient is in town from [place] where he receives his urologic care currently, previously having seen Urology in [place]. He was brought in for altered mental status, nausea, vomiting and weakness. He was started on antibiotics for presumed urinary tract infection (pyruia and bacteruria on UA). He does not have fevers or leukocytosis. His cultures are pending. He is COVID +. His serum Cr is 2.0 (unclear what his baseline is; although last serum Cr 18 months ago was 1.5). He does not have any electrolyte abnormalities and is making urine into a condom cath. He has no flank pain or abdominal pain.     Medical History:   No past medical history on file.    Surgical History:   No past surgical history on file.    Social History:   Social History     Social History Narrative   • Not on file       Family History:   No family history on file.    Allergies:   Lisinopril    Home Medications:  •  aspirin, Take 81 mg by mouth daily.  •  atorvastatin, Take 20 mg by mouth nightly.  •  clopidogreL, Take 75 mg by mouth daily.  •  glyBURIDE, Take 10 mg by mouth 2 (two) times a day with meals.  •  metFORMIN, Take 1,000 mg by mouth 2 (two) times a day with meals.  •  silodosin, Take 8 mg by mouth every evening.    Current Medications:  •  acetaminophen, 650 mg, oral, q4h PRN  •  aspirin, 81 mg, oral, Daily  •  atorvastatin, 20 mg, oral, Daily (6p)  •  cefTRIAXone, 1 g, intravenous, q24h INT  •  clopidogreL, 75  "mg, oral, Daily  •  glucose, 16-32 g of dextrose, oral, PRN **OR** dextrose, 15-30 g of dextrose, oral, PRN **OR** glucagon, 1 mg, intramuscular, PRN **OR** dextrose 50 % in water (D50), 25 mL, intravenous, PRN  •  [Provider Managed Hold] enoxaparin, 40 mg, subcutaneous, Daily (6p)  •  insulin lispro U-100, 3-5 Units, subcutaneous, With meals & nightly  •  lactated ringer's, 500 mL, intravenous, Once  •  lactated ringer's, , intravenous, Continuous  •  ondansetron, 4 mg, intravenous, q6h PRN    Review of Systems:  All other systems reviewed are negative except those mentioned above.      Objective     Physicial Exam  Visit Vitals  BP (!) 96/54 (BP Location: Left upper arm, Patient Position: Lying)   Pulse 70   Temp 37 °C (98.6 °F) (Oral)   Resp 16   Ht 1.88 m (6' 2\")   Wt 68.9 kg (151 lb 12.8 oz)   SpO2 96%   BMI 19.49 kg/m²     General appearance: alert, cooperative, no acute distress  Abdomen: soft, non-tender to palpation, non-distended  Genitalia: condom cath draining yellow urine with debris  Neurologic: Alert and oriented    Labs  BMP Results       12/20/23     1359        K 4.3    Cl 104    CO2 20    Glucose 181    BUN 34    Creatinine 2.1    Calcium 9.1    Anion Gap 14    EGFR 31.4         Comment for K at 1359 on 12/20/23: Results obtained on plasma. Plasma Potassium values may be up to 0.4 mEQ/L less than serum values. The differences may be greater for patients with high platelet or white cell counts.    Comment for EGFR at 1359 on 12/20/23: Calculation based on the Chronic Kidney Disease Epidemiology Collaboration (CKD-EPI) equation refit without adjustment for race.        CBC Results       12/20/23     1359    WBC 8.04    RBC 4.52    HGB 12.2    HCT 38.8    MCV 85.8    MCH 27.0    MCHC 31.4            UA Results       12/20/23     1359    Color Altagracia    Clarity Turbid    Glucose Negative    Bilirubin Negative    Ketones Negative    Sp Grav 1.013    Blood +1    Ph 6.0    Protein +1    " Urobilinogen 0.2    Nitrite Negative    Leuk Est +3    WBC Too Numerous To Count    RBC 36 TO 50    Bacteria +3         Comment for Blood at 1359 on 12/20/23: The sensitivity of the occult blood test is equivalent to approximately 4 intact RBC/HPF.        Microbiology Results     Procedure Component Value Units Date/Time    SARS-CoV-2 (COVID-19) Nasopharynx [[id number]]  (Abnormal) Collected: 12/20/23 1400    Specimen: Nasopharyngeal Swab from Nasopharynx Updated: 12/20/23 1528    Narrative:      The following orders were created for panel order SARS-CoV-2 (COVID-19) Nasopharynx.  Procedure                               Abnormality         Status                     ---------                               -----------         ------                     SARS-COV-2 (COVID-19)/ F...[[id number]]  Abnormal            Final result                 Please view results for these tests on the individual orders.    SARS-COV-2 (COVID-19)/ FLU A/B, AND RSV, PCR Nasopharynx [[id number]]  (Abnormal) Collected: 12/20/23 1400    Specimen: Nasopharyngeal Swab from Nasopharynx Updated: 12/20/23 1528     SARS-CoV-2 (COVID-19) Positive     Influenza A Negative     Influenza B Negative     Respiratory Syncytial Virus Negative    Narrative:      Testing performed using real-time PCR for detection of COVID-19. EUA approved validation studies performed on site.             Imaging  CT CHEST WITHOUT IV CONTRAST    Result Date: 12/20/2023  IMPRESSION: Hydronephrotic right kidney which is tethered posteriorly to the posterior pararenal fascia where there is scarring and soft tissue thickening that may be related to prior surgery and/or nephrostomy tube placement.  Caliber change is likely at the ureteropelvic junction which is likely distorted and narrowed--potentially related to stricturing from prior surgery or inflammation but not well evaluated on this unenhanced study. Nephrostogram (either via a percutaneous antegrade or retrograde approach)  may be helpful for assessment. Left nephrectomy with multiple clips in the retroperitoneum. Dilated distal left ureteral segment. No small bowel obstruction. Large colonic stool burden. No acute disease seen in the chest. Scattered areas of endobronchial mucous plugging noted without focal consolidation. Severe coronary atherosclerosis. Additional findings as above.     CT ABDOMEN PELVIS WITHOUT IV CONTRAST    Result Date: 12/20/2023  IMPRESSION: Hydronephrotic right kidney which is tethered posteriorly to the posterior pararenal fascia where there is scarring and soft tissue thickening that may be related to prior surgery and/or nephrostomy tube placement.  Caliber change is likely at the ureteropelvic junction which is likely distorted and narrowed--potentially related to stricturing from prior surgery or inflammation but not well evaluated on this unenhanced study. Nephrostogram (either via a percutaneous antegrade or retrograde approach) may be helpful for assessment. Left nephrectomy with multiple clips in the retroperitoneum. Dilated distal left ureteral segment. No small bowel obstruction. Large colonic stool burden. No acute disease seen in the chest. Scattered areas of endobronchial mucous plugging noted without focal consolidation. Severe coronary atherosclerosis. Additional findings as above.            Assessment     78-year-old male with  history as follows:     - Prostate cancer status post pelvic radiation in 1990s, now with metachronous metastasis on ADT and second generation anti-androgen   - Bilateral renal masses status post left radical nephrectomy in 1990s and right partial nephrectomy in 2009  - He had a long-term indwelling right percutaneous nephrostomy for unclear reasons, this was removed 2 years ago and he has been voiding normally with some urinary frequency    We were asked to comment on his right hydronephrosis. There is no obvious etiology of obstruction  (stones, extrinsic masses,  etc.). I attempted to talk with his home urologist but was unable to reach him. Based on his clinical history and appearance of renal parenchyma on CT, this hydronephrosis is likely chronic. I do not see a clear reason to place a stent or nephrostomy in this patient. He does not have any signs of sepsis (absent fever, WBC, stable vitals). He does not have any flank pain. He is making urine with a solitary kidney which indicates he is not completely obstructed on the right. His is not in renal failure.        Plan     Continue to monitor. If his UOP curtails, his renal function precipitously declines or he develops any signs of sepsis, we will reconsider.     He should continue to follow-up with his Urologist in [place] for management of his prostate cancer and to monitor his hydronephrosis and global renal function.     Diogo Mar DO PGY-5  [facility], Chief Resident  Denver Borges Pager #[phone]  Rachael Pager #[phone]

## 2023-12-21 NOTE — HOSPITAL COURSE
John is a 78 y.o. male admitted on 12/20/2023 with Lower urinary tract infection [N39.0]  Complicated UTI (urinary tract infection) [N39.0]  COVID-19 [U07.1]. Principal problem is Complicated UTI (urinary tract infection).    Past Medical History  John has past medical history of DM, hypertension, CVA, renal cancer status post left nephrectomy and prostate cancer.    History of Present Illness   Presents with UTI and found to COVID-positive.

## 2023-12-21 NOTE — PLAN OF CARE
Problem: Adult Inpatient Plan of Care  Goal: Plan of Care Review  Outcome: Progressing  Flowsheets (Taken 12/21/2023 0424)  Progress: improving  Outcome Evaluation: pt admitted to floor from ER, pt oriented but forgetful. pt incontinent of urine and stool, pt cleaned and changed. [place] cath placed. pt denies pain. isolation precautions continued. bed alarm activated. call bell within reach. will continue to monitor  Plan of Care Reviewed With: patient  Goal: Patient-Specific Goal (Individualized)  Outcome: Progressing  Goal: Absence of Hospital-Acquired Illness or Injury  Outcome: Progressing  Goal: Optimal Comfort and Wellbeing  Outcome: Progressing  Goal: Readiness for Transition of Care  Outcome: Progressing     Problem: Infection  Goal: Absence of Infection Signs and Symptoms  Outcome: Progressing   Plan of Care Review  Plan of Care Reviewed With: patient  Progress: improving  Outcome Evaluation: pt admitted to floor from ER, pt oriented but forgetful. pt incontinent of urine and stool, pt cleaned and changed. [place] cath placed. pt denies pain. isolation precautions continued. bed alarm activated. call bell within reach. will continue to monitor

## 2023-12-21 NOTE — PROGRESS NOTES
Hospital Medicine Service -  Daily Progress Note       SUBJECTIVE   Interval History: CTSP - blood pressure lower than prior.  Patient sitting up in bed, eating breakfast without difficulty.  Memory is somewhat poor but he is AAO x 3.  Gets date correctly after pausing and location as hospital.  Has some trouble with the situation.  Re-reorients easily.  He denies any confusion or urinary symptoms, but unclear if he would remember these.     OBJECTIVE      Vital signs in last 24 hours:  Temp:  [36.8 °C (98.3 °F)-37.2 °C (99 °F)] 37 °C (98.6 °F)  Heart Rate:  [63-91] 70  Resp:  [16-22] 16  BP: ()/(54-83) 96/54    Intake/Output Summary (Last 24 hours) at 12/21/2023 0837  Last data filed at 12/21/2023 0800  Gross per 24 hour   Intake 1010 ml   Output 460 ml   Net 550 ml       PHYSICAL EXAMINATION      Physical Exam  Vitals reviewed.   Constitutional:       Appearance: Normal appearance.   HENT:      Head: Normocephalic and atraumatic.   Eyes:      General: No scleral icterus.  Cardiovascular:      Rate and Rhythm: Normal rate and regular rhythm.   Pulmonary:      Effort: Pulmonary effort is normal.      Breath sounds: Normal breath sounds.   Abdominal:      General: Bowel sounds are normal.      Palpations: Abdomen is soft.   Genitourinary:     Comments: condom cath in place, clear yellow urine  Musculoskeletal:      Right lower leg: No edema.      Left lower leg: No edema.   Neurological:      Mental Status: He is alert. He is disoriented.   Psychiatric:         Mood and Affect: Mood normal.         Behavior: Behavior normal.            LINES, CATHETERS, DRAINS, AIRWAYS, AND WOUNDS   Lines, Drains, and Airways:  Wounds (agree with documentation and present on admission):  Peripheral IV (Adult) 12/20/23 Left;Posterior Forearm (Active)   Number of days: 1       Peripheral IV (Adult) 12/20/23 Posterior;Right Forearm (Active)   Number of days: 1       External Urinary Catheter Small (Active)   Number of days: 1        Wound Pressure Injury Medial Buttock (Active)   Number of days: 1         Comments:      LABS / IMAGING / TELE      Labs  Labs are pending.ordered      Imaging  admission CT scan reviewed    ECG/Telemetry  nsr    ASSESSMENT AND PLAN      * Complicated UTI (urinary tract infection)  Assessment & Plan  UA was generally positive with +3 bacteria  Ceftriaxone started in the ED and will continue 1 g daily  Follow urine cultures  CT of the abdomen pelvis shows solitary right kidney with hydronephrosis.  Admitting team reviewed his nephrogram from March and findings were similar (results not available to me).  By report at that time he had hydronephrosis and 2 nonobstructing stones.  Stones on this imaging were absent.  - continue CTx pending culture results, likely treat for ascending UTI given CT scan findings  - consult Urology to discuss further imaging or treatment for hydronephrosis    DARBY (acute kidney injury) (CMS/Formerly [facility])  Assessment & Plan  last creatinine available at this time shows baseline 1.45 on 6/14/22  p/w DARBY in the setting of COVID19 and UTI/hydronephrosis  - Consult Urology as above  - IVF until tolerating PO well and/or DARBY improved    COVID  Assessment & Plan  Patient with mild symptoms of nausea and lower extremity weakness  Incidentally found to be COVID-positive on admission  Supportive medication if needed  Monitor for any COVID19 related symptoms    Stroke (CMS/[facility]ly [facility])  Assessment & Plan  Continue ASA 81 mg daily  Clopidogrel 75 mg daily  Residual vision changes and speech difficulty  PT/OT consulted    Hypertension  Assessment & Plan  Patient is not currently taking any medications as BP is usually within normal limits  Consider further management if BP remains elevated    Mixed hyperlipidemia  Assessment & Plan  Continue atorvastatin 20 mg nightly    Diabetes mellitus (CMS/[facility]ly [facility])  Assessment & Plan  Hold oral hypoglycemics while in house  Sliding-scale insulin with meals and nightly  Diabetic  diet         VTE Assessment: Padua VTE Score: 5  VTE Prophylaxis:  Current anticoagulants:  [Provider Managed Hold] enoxaparin (LOVENOX) syringe 40 mg, subcutaneous, Daily (6p)      Code Status: Full Code  Palliative Care Screening Score: 1   Estimated Discharge Date: 12/22/2023     Disposition Planning: pending PT/OT but anticipate discharge home once urine cultures final and creatinine stble     Blayne Dodson MD  12/21/2023

## 2023-12-21 NOTE — PROGRESS NOTES
Addendum        Updated patient's wife.  She also reported good conversation with Dr. Mar.  She is in agreement with plans to treat with antibiotics and monitor.  She thinks his baseline creatinine is 1.7-2.1 depending on how he is doing with hydration on the day outpatient labs are drawn, but will try to go into his patient portal to confirm.  I d/w her LUIS Saturday assuming that the urine culture results are back by then.        Blayne Dodson MD

## 2023-12-22 LAB
ANION GAP SERPL CALC-SCNC: 6 MEQ/L (ref 3–15)
BASOPHILS # BLD: 0.01 K/UL (ref 0.01–0.1)
BASOPHILS NFR BLD: 0.2 %
BUN SERPL-MCNC: 27 MG/DL (ref 7–25)
CALCIUM SERPL-MCNC: 8.2 MG/DL (ref 8.6–10.3)
CHLORIDE SERPL-SCNC: 108 MEQ/L (ref 98–107)
CO2 SERPL-SCNC: 24 MEQ/L (ref 21–31)
CREAT SERPL-MCNC: 1.7 MG/DL (ref 0.7–1.3)
DIFFERENTIAL METHOD BLD: ABNORMAL
EGFRCR SERPLBLD CKD-EPI 2021: 40.5 ML/MIN/1.73M*2
EOSINOPHIL # BLD: 0.04 K/UL (ref 0.04–0.54)
EOSINOPHIL NFR BLD: 0.8 %
ERYTHROCYTE [DISTWIDTH] IN BLOOD BY AUTOMATED COUNT: 13.8 % (ref 11.6–14.4)
GLUCOSE BLD-MCNC: 158 MG/DL (ref 70–99)
GLUCOSE BLD-MCNC: 181 MG/DL (ref 70–99)
GLUCOSE BLD-MCNC: 282 MG/DL (ref 70–99)
GLUCOSE BLD-MCNC: 88 MG/DL (ref 70–99)
GLUCOSE SERPL-MCNC: 77 MG/DL (ref 70–99)
HCT VFR BLDCO AUTO: 36.4 % (ref 40.1–51)
HGB BLD-MCNC: 11.3 G/DL (ref 13.7–17.5)
IMM GRANULOCYTES # BLD AUTO: 0.01 K/UL (ref 0–0.08)
IMM GRANULOCYTES NFR BLD AUTO: 0.2 %
LYMPHOCYTES # BLD: 0.97 K/UL (ref 1.2–3.5)
LYMPHOCYTES NFR BLD: 19.7 %
MCH RBC QN AUTO: 26.7 PG (ref 28–33.2)
MCHC RBC AUTO-ENTMCNC: 31 G/DL (ref 32.2–36.5)
MCV RBC AUTO: 85.8 FL (ref 83–98)
MONOCYTES # BLD: 0.48 K/UL (ref 0.3–1)
MONOCYTES NFR BLD: 9.7 %
NEUTROPHILS # BLD: 3.42 K/UL (ref 1.7–7)
NEUTS SEG NFR BLD: 69.4 %
NRBC BLD-RTO: 0 %
PDW BLD AUTO: 9.5 FL (ref 9.4–12.4)
PLATELET # BLD AUTO: 168 K/UL (ref 150–350)
POCT TEST: ABNORMAL
POCT TEST: NORMAL
POTASSIUM SERPL-SCNC: 4.2 MEQ/L (ref 3.5–5.1)
RBC # BLD AUTO: 4.24 M/UL (ref 4.5–5.8)
SODIUM SERPL-SCNC: 138 MEQ/L (ref 136–145)
WBC # BLD AUTO: 4.93 K/UL (ref 3.8–10.5)

## 2023-12-22 PROCEDURE — 99233 SBSQ HOSP IP/OBS HIGH 50: CPT | Performed by: HOSPITALIST

## 2023-12-22 PROCEDURE — 97116 GAIT TRAINING THERAPY: CPT | Mod: GP

## 2023-12-22 PROCEDURE — 80048 BASIC METABOLIC PNL TOTAL CA: CPT | Performed by: HOSPITALIST

## 2023-12-22 PROCEDURE — 63700000 HC SELF-ADMINISTRABLE DRUG: Performed by: HOSPITALIST

## 2023-12-22 PROCEDURE — 85025 COMPLETE CBC W/AUTO DIFF WBC: CPT | Performed by: HOSPITALIST

## 2023-12-22 PROCEDURE — 12000000 HC ROOM AND CARE MED/SURG

## 2023-12-22 PROCEDURE — 63600000 HC DRUGS/DETAIL CODE: Mod: JZ | Performed by: HOSPITALIST

## 2023-12-22 PROCEDURE — 25800000 HC PHARMACY IV SOLUTIONS: Performed by: HOSPITALIST

## 2023-12-22 PROCEDURE — 36415 COLL VENOUS BLD VENIPUNCTURE: CPT | Performed by: HOSPITALIST

## 2023-12-22 RX ADMIN — ENOXAPARIN SODIUM 30 MG: 30 INJECTION SUBCUTANEOUS at 17:45

## 2023-12-22 RX ADMIN — CLOPIDOGREL 75 MG: 75 TABLET ORAL at 08:48

## 2023-12-22 RX ADMIN — INSULIN LISPRO 3 UNITS: 100 INJECTION, SOLUTION INTRAVENOUS; SUBCUTANEOUS at 22:49

## 2023-12-22 RX ADMIN — ASPIRIN 81 MG: 81 TABLET, COATED ORAL at 08:48

## 2023-12-22 RX ADMIN — CEFTRIAXONE SODIUM 1 G: 1 INJECTION, POWDER, FOR SOLUTION INTRAMUSCULAR; INTRAVENOUS at 11:34

## 2023-12-22 RX ADMIN — INSULIN LISPRO 5 UNITS: 100 INJECTION, SOLUTION INTRAVENOUS; SUBCUTANEOUS at 17:42

## 2023-12-22 RX ADMIN — ATORVASTATIN CALCIUM 20 MG: 20 TABLET, FILM COATED ORAL at 17:45

## 2023-12-22 ASSESSMENT — COGNITIVE AND FUNCTIONAL STATUS - GENERAL
CLIMB 3 TO 5 STEPS WITH RAILING: 3 - A LITTLE
MOVING TO AND FROM BED TO CHAIR: 3 - A LITTLE
WALKING IN HOSPITAL ROOM: 3 - A LITTLE
WALKING IN HOSPITAL ROOM: 3 - A LITTLE
MOVING TO AND FROM BED TO CHAIR: 3 - A LITTLE
STANDING UP FROM CHAIR USING ARMS: 3 - A LITTLE
CLIMB 3 TO 5 STEPS WITH RAILING: 3 - A LITTLE
AFFECT: WFL
STANDING UP FROM CHAIR USING ARMS: 3 - A LITTLE

## 2023-12-22 NOTE — PROGRESS NOTES
Urology Daily Progress Note    Subjective     Interval History: No acute events overnight.  Condom catheter remains in place draining clear yellow urine.  Random bladder scan 229cc yesterday.  Pain well-controlled.      Objective     Vital signs in last 24 hours:  Temp:  [37 °C (98.6 °F)] 37 °C (98.6 °F)  Heart Rate:  [62-70] 62  Resp:  [16-18] 18  BP: ()/(54-94) 146/71      Intake/Output Summary (Last 24 hours) at 12/22/2023 0736  Last data filed at 12/21/2023 1914  Gross per 24 hour   Intake 20 ml   Output 450 ml   Net -430 ml     Intake/Output this shift:  No intake/output data recorded.    Labs  BMP Results       12/21/23 12/20/23     0911 1359     138    K 4.0 4.3    Cl 105 104    CO2 20 20    Glucose 266 181    BUN 31 34    Creatinine 2.0 2.1    Calcium 8.0 9.1    Anion Gap 11 14    EGFR 33.3 31.4         Comment for K at 1359 on 12/20/23: Results obtained on plasma. Plasma Potassium values may be up to 0.4 mEQ/L less than serum values. The differences may be greater for patients with high platelet or white cell counts.    Comment for EGFR at 0911 on 12/21/23: Calculation based on the Chronic Kidney Disease Epidemiology Collaboration (CKD-EPI) equation refit without adjustment for race.    Comment for EGFR at 1359 on 12/20/23: Calculation based on the Chronic Kidney Disease Epidemiology Collaboration (CKD-EPI) equation refit without adjustment for race.        CBC Results       12/21/23 12/20/23     0911 1359    WBC 5.32 8.04    RBC 3.98 4.52    HGB 10.7 12.2    HCT 34.4 38.8    MCV 86.4 85.8    MCH 26.9 27.0    MCHC 31.1 31.4     211        UA Results       12/20/23     1359    Color Altagracia    Clarity Turbid    Glucose Negative    Bilirubin Negative    Ketones Negative    Sp Grav 1.013    Blood +1    Ph 6.0    Protein +1    Urobilinogen 0.2    Nitrite Negative    Leuk Est +3    WBC Too Numerous To Count    RBC 36 TO 50    Bacteria +3         Comment for Blood at 1359 on 12/20/23: The  sensitivity of the occult blood test is equivalent to approximately 4 intact RBC/HPF.        Microbiology Results     Procedure Component Value Units Date/Time    SARS-CoV-2 (COVID-19) Nasopharynx [[id number]]  (Abnormal) Collected: 12/20/23 1400    Specimen: Nasopharyngeal Swab from Nasopharynx Updated: 12/20/23 1528    Narrative:      The following orders were created for panel order SARS-CoV-2 (COVID-19) Nasopharynx.  Procedure                               Abnormality         Status                     ---------                               -----------         ------                     SARS-COV-2 (COVID-19)/ F...[[id number]]  Abnormal            Final result                 Please view results for these tests on the individual orders.    SARS-COV-2 (COVID-19)/ FLU A/B, AND RSV, PCR Nasopharynx [[id number]]  (Abnormal) Collected: 12/20/23 1400    Specimen: Nasopharyngeal Swab from Nasopharynx Updated: 12/20/23 1528     SARS-CoV-2 (COVID-19) Positive     Influenza A Negative     Influenza B Negative     Respiratory Syncytial Virus Negative    Narrative:      Testing performed using real-time PCR for detection of COVID-19. EUA approved validation studies performed on site.     Blood Culture Blood, Venous [[id number]]  (Normal) Collected: 12/20/23 1359    Specimen: Blood, Venous Updated: 12/21/23 1901     Culture No growth at 18-24 hours    Blood Culture Blood, Venous [[id number]]  (Normal) Collected: 12/20/23 1359    Specimen: Blood, Venous Updated: 12/21/23 1901     Culture No growth at 18-24 hours          Imaging  CT CHEST WITHOUT IV CONTRAST    Result Date: 12/20/2023  IMPRESSION: Hydronephrotic right kidney which is tethered posteriorly to the posterior pararenal fascia where there is scarring and soft tissue thickening that may be related to prior surgery and/or nephrostomy tube placement.  Caliber change is likely at the ureteropelvic junction which is likely distorted and narrowed--potentially related to  "stricturing from prior surgery or inflammation but not well evaluated on this unenhanced study. Nephrostogram (either via a percutaneous antegrade or retrograde approach) may be helpful for assessment. Left nephrectomy with multiple clips in the retroperitoneum. Dilated distal left ureteral segment. No small bowel obstruction. Large colonic stool burden. No acute disease seen in the chest. Scattered areas of endobronchial mucous plugging noted without focal consolidation. Severe coronary atherosclerosis. Additional findings as above.     CT ABDOMEN PELVIS WITHOUT IV CONTRAST    Result Date: 12/20/2023  IMPRESSION: Hydronephrotic right kidney which is tethered posteriorly to the posterior pararenal fascia where there is scarring and soft tissue thickening that may be related to prior surgery and/or nephrostomy tube placement.  Caliber change is likely at the ureteropelvic junction which is likely distorted and narrowed--potentially related to stricturing from prior surgery or inflammation but not well evaluated on this unenhanced study. Nephrostogram (either via a percutaneous antegrade or retrograde approach) may be helpful for assessment. Left nephrectomy with multiple clips in the retroperitoneum. Dilated distal left ureteral segment. No small bowel obstruction. Large colonic stool burden. No acute disease seen in the chest. Scattered areas of endobronchial mucous plugging noted without focal consolidation. Severe coronary atherosclerosis. Additional findings as above.          Physicial Exam  Visit Vitals  BP (!) 146/71 (BP Location: Right upper arm)   Pulse 62   Temp 37 °C (98.6 °F) (Oral)   Resp 18   Ht 1.88 m (6' 2\")   Wt 68.9 kg (151 lb 12.8 oz)   SpO2 98%   BMI 19.49 kg/m²     General appearance: alert, cooperative, no acute distress  Lungs: Unlabored respirations, symmetric chest expansion  Heart: regular rate and rhythm  Abdomen: soft, non-tender to palpation, non-distended  : Normal male " genitalia  Extremities: extremities normal, warm and well-perfused; no cyanosis, clubbing, or edema and no redness or tenderness in the calves bilaterally  Neurologic: Alert and oriented X 3       Assessment     78-year-old male with  history as follows:      - Prostate cancer status post pelvic radiation in 1990s, now with metachronous metastasis on ADT and second generation anti-androgen   - Bilateral renal masses status post left radical nephrectomy in 1990s and right partial nephrectomy in 2009  - He had a long-term indwelling right percutaneous nephrostomy for unclear reasons, this was removed 2 years ago and he has been voiding normally with some urinary frequency    Urology was consulted for findings of right hydronephrosis with no obvious etiology of obstruction, most likely chronic.          Plan     - No acute urologic intervention indicated at this time  - Continue to monitor urine output and renal function  - If any new concerns for infection/sepsis, please reach out to urology  - Plan to follow-up with his outpatient urologist in [place] after discharge  - Rest of care per primary team    [facility]  Jim Hooks MD PGY1  [facility] Pager #[phone]  Denver Borges Pager #[phone]

## 2023-12-22 NOTE — PROGRESS NOTES
Hospital Medicine Service -  Daily Progress Note       SUBJECTIVE   Interval History: Patient feels well.  Ate eggs for breakfast with no complaints.  No respiratory symptoms.  good UOP after IVF.     OBJECTIVE      Vital signs in last 24 hours:  Temp:  [36.5 °C (97.7 °F)-37 °C (98.6 °F)] 36.5 °C (97.7 °F)  Heart Rate:  [60-70] 60  Resp:  [18] 18  BP: (131-184)/(65-94) 184/88    Intake/Output Summary (Last 24 hours) at 12/22/2023 1215  Last data filed at 12/21/2023 1914  Gross per 24 hour   Intake 10 ml   Output 450 ml   Net -440 ml       PHYSICAL EXAMINATION      Physical Exam  Vitals reviewed.   Constitutional:       Appearance: Normal appearance.   HENT:      Head: Normocephalic and atraumatic.   Eyes:      General: No scleral icterus.  Cardiovascular:      Rate and Rhythm: Normal rate and regular rhythm.   Pulmonary:      Effort: Pulmonary effort is normal.      Breath sounds: Normal breath sounds. No wheezing, rhonchi or rales.   Abdominal:      General: Bowel sounds are normal.      Palpations: Abdomen is soft.   Musculoskeletal:      Right lower leg: No edema.      Left lower leg: No edema.   Neurological:      Mental Status: He is alert and oriented to person, place, and time.      Comments: slow speech   Psychiatric:         Mood and Affect: Mood normal.         Behavior: Behavior normal.            LINES, CATHETERS, DRAINS, AIRWAYS, AND WOUNDS   Lines, Drains, and Airways:  Wounds (agree with documentation and present on admission):  Peripheral IV (Adult) 12/20/23 Left;Posterior Forearm (Active)   Number of days: 2       Peripheral IV (Adult) 12/20/23 Posterior;Right Forearm (Active)   Number of days: 2       External Urinary Catheter Small (Active)   Number of days: 2       Wound Pressure Injury Medial Buttock (Active)   Number of days: 2         Comments:      LABS / IMAGING / TELE      Labs  I have reviewed the patient's pertinent labs.  Significant abnormals are DARBY.  I called micro lb, UCx with  "GNR, species and sensitivities should be back tomorrow.  Also small colony of GPCs.          ASSESSMENT AND PLAN      * Complicated UTI (urinary tract infection)  Assessment & Plan  UA was generally positive with +3 bacteria  Ceftriaxone started in the ED and will continue 1 g daily  Follow urine cultures  CT of the abdomen pelvis shows solitary right kidney with hydronephrosis.  Admitting team reviewed his nephrogram from March and findings were similar (results not available to me).  By report at that time he had hydronephrosis and 2 nonobstructing stones.  Stones on this imaging were absent.  - continue CTx pending culture results  - Urology consult appreciated, findings appear chronic and he is now back to near his prior baseline renal function      DARBY (acute kidney injury) (CMS/Formerly [facility])  Assessment & Plan  Patient's wife has access to his medical records, \"Creatinine was 1.73 on aug 30, 2023.  In March of this year, he also had a UTI and his creatinine elevated to 2.19 but when treated at the hospital, it lowered to 1.79 and 1.86 consecutively.\"    p/w DARBY in the setting of COVID19 and UTI/hydronephrosis  - now back to near baseline  - dc IVF and monitor    COVID  Assessment & Plan  Patient with mild symptoms of nausea and lower extremity weakness  Incidentally found to be COVID-positive on admission  Supportive medication if needed  Monitor for any COVID19 related symptoms    Stroke (CMS/Formerly [facility])  Assessment & Plan  Continue ASA 81 mg daily  Clopidogrel 75 mg daily  Residual vision changes and speech difficulty  PT/OT consulted    Hypertension  Assessment & Plan  Patient is not currently taking any medications as BP is usually within normal limits  some low and high readings over the last 24 hours, in the setting of illness, DARBY and IVF  monitor blood pressures, reasonable to consider starting a CCB if remains markedly elevated    Mixed hyperlipidemia  Assessment & Plan  Continue atorvastatin 20 mg " nightly    Diabetes mellitus (CMS/HCC)  Assessment & Plan  Hold oral hypoglycemics while in house  Sliding-scale insulin with meals and nightly  Diabetic diet       VTE Assessment: Padua VTE Score: 5  VTE Prophylaxis:  Current anticoagulants:  enoxaparin (LOVENOX) syringe 30 mg, subcutaneous, Daily (6p)      Code Status: Full Code  Palliative Care Screening Score: 1   Estimated Discharge Date: 12/23/2023     Disposition Planning: home tomorrow pending final culture results     Blayne Dodson MD  12/22/2023

## 2023-12-23 VITALS
TEMPERATURE: 98.3 F | SYSTOLIC BLOOD PRESSURE: 144 MMHG | HEIGHT: 74 IN | HEART RATE: 70 BPM | DIASTOLIC BLOOD PRESSURE: 69 MMHG | OXYGEN SATURATION: 97 % | WEIGHT: 151.8 LBS | RESPIRATION RATE: 16 BRPM | BODY MASS INDEX: 19.48 KG/M2

## 2023-12-23 PROBLEM — N17.9 AKI (ACUTE KIDNEY INJURY) (CMS/HCC): Status: RESOLVED | Noted: 2023-12-21 | Resolved: 2023-12-23

## 2023-12-23 LAB
ANION GAP SERPL CALC-SCNC: 9 MEQ/L (ref 3–15)
BACTERIA UR CULT: ABNORMAL
BUN SERPL-MCNC: 28 MG/DL (ref 7–25)
CALCIUM SERPL-MCNC: 8.2 MG/DL (ref 8.6–10.3)
CHLORIDE SERPL-SCNC: 103 MEQ/L (ref 98–107)
CO2 SERPL-SCNC: 23 MEQ/L (ref 21–31)
CREAT SERPL-MCNC: 1.6 MG/DL (ref 0.7–1.3)
EGFRCR SERPLBLD CKD-EPI 2021: 43.6 ML/MIN/1.73M*2
GLUCOSE BLD-MCNC: 116 MG/DL (ref 70–99)
GLUCOSE BLD-MCNC: 193 MG/DL (ref 70–99)
GLUCOSE BLD-MCNC: 235 MG/DL (ref 70–99)
GLUCOSE SERPL-MCNC: 214 MG/DL (ref 70–99)
POCT TEST: ABNORMAL
POTASSIUM SERPL-SCNC: 4 MEQ/L (ref 3.5–5.1)
SODIUM SERPL-SCNC: 135 MEQ/L (ref 136–145)

## 2023-12-23 PROCEDURE — 36415 COLL VENOUS BLD VENIPUNCTURE: CPT | Performed by: HOSPITALIST

## 2023-12-23 PROCEDURE — 80048 BASIC METABOLIC PNL TOTAL CA: CPT | Performed by: HOSPITALIST

## 2023-12-23 PROCEDURE — 63700000 HC SELF-ADMINISTRABLE DRUG: Performed by: HOSPITALIST

## 2023-12-23 PROCEDURE — 99239 HOSP IP/OBS DSCHRG MGMT >30: CPT | Performed by: STUDENT IN AN ORGANIZED HEALTH CARE EDUCATION/TRAINING PROGRAM

## 2023-12-23 PROCEDURE — 63600000 HC DRUGS/DETAIL CODE: Mod: JZ | Performed by: HOSPITALIST

## 2023-12-23 PROCEDURE — 25800000 HC PHARMACY IV SOLUTIONS: Performed by: HOSPITALIST

## 2023-12-23 RX ORDER — ATORVASTATIN CALCIUM 20 MG/1
20 TABLET, FILM COATED ORAL
Qty: 30 TABLET | Refills: 0 | Status: SHIPPED | OUTPATIENT
Start: 2023-12-23 | End: 2024-01-22

## 2023-12-23 RX ORDER — CEFPODOXIME PROXETIL 200 MG/1
200 TABLET, FILM COATED ORAL 2 TIMES DAILY
Qty: 12 TABLET | Refills: 0 | Status: SHIPPED | OUTPATIENT
Start: 2023-12-24 | End: 2023-12-30

## 2023-12-23 RX ADMIN — INSULIN LISPRO 3 UNITS: 100 INJECTION, SOLUTION INTRAVENOUS; SUBCUTANEOUS at 12:18

## 2023-12-23 RX ADMIN — CLOPIDOGREL 75 MG: 75 TABLET ORAL at 08:15

## 2023-12-23 RX ADMIN — CEFTRIAXONE SODIUM 1 G: 1 INJECTION, POWDER, FOR SOLUTION INTRAMUSCULAR; INTRAVENOUS at 11:34

## 2023-12-23 RX ADMIN — ASPIRIN 81 MG: 81 TABLET, COATED ORAL at 08:15

## 2023-12-23 NOTE — PLAN OF CARE
Plan of Care Review  Plan of Care Reviewed With: patient  Progress: improving  Outcome Evaluation: Pt received in bed AAOx2-3. Oriented to self and place. No complaints of pain. VSS. Rocephin continued. IV fluids discontinued. Pt was up in chair with PT/OT. Assisted back to bed to eat dinner with 2 person assist. Incontinence care provided. Scheduled medications administered. Pt in bed with call bell in reach, wife at bedside. No further concerns at this time.

## 2023-12-23 NOTE — PLAN OF CARE
Plan of Care Review  Plan of Care Reviewed With: patient  Progress: no change  Outcome Evaluation: Pt admitted d/t UTI infection. AOx3 but sometimes can be forgetful, on room air. No c/o of pain, SOB, N/V/D at this time. Medications administered as scheduled.  and he receieved 3u of insulin coverage for the night. Pt resting comfortably, call bell within reach, safety measures in place.

## 2023-12-23 NOTE — DISCHARGE SUMMARY
Hospital Medicine Service -  Inpatient Discharge Summary        BRIEF OVERVIEW   Patient: John Valle (1945)    Admitting Provider: Blayne Dodson MD  Attending Provider: Davonte Ogden MD Attending phys phone: [phone]    PCP: Chaz Maria [phone]    Admission Date: 12/20/2023  Discharge Date: 12/23/2023     DISCHARGE DIAGNOSES      Primary Discharge Diagnosis  Complicated UTI (urinary tract infection)    Secondary Discharge Diagnoses  Active Hospital Problems    Diagnosis Date Noted   • Complicated UTI (urinary tract infection) 12/20/2023     Priority: Medium   • Lower urinary tract infection 12/21/2023   • Mixed hyperlipidemia 12/20/2023   • COVID 12/20/2023   • Hypertension 09/13/2014   • Diabetes mellitus (CMS/HCC) 03/12/2014   • Stroke (CMS/HCC) 03/12/2014      Resolved Hospital Problems    Diagnosis Date Noted Date Resolved   • DARBY (acute kidney injury) (CMS/[facility]) 12/21/2023 12/23/2023       Problem List on Day of Discharge  * Complicated UTI (urinary tract infection)  Assessment & Plan  UA was generally positive with +3 bacteia. Ceftriaxone started in the ED and will continue 1 g daily. CT of the abdomen pelvis shows solitary right kidney with hydronephrosis.  Admitting team reviewed his nephrogram from March and findings were similar.  By report at that time he had hydronephrosis and 2 nonobstructing stones.  Stones on this imaging were absent. Treated with CTX while inpatient. Will DC on cepodoxime to complete 10d course.       COVID  Assessment & Plan  Patient with mild symptoms of nausea and lower extremity weakness. Incidentally found to be COVID-positive on admission. Continue isolation precautions per current guidelines.     Stroke (CMS/[facility])  Assessment & Plan  Continue ASA 81 mg daily  Clopidogrel 75 mg daily      Hypertension  Assessment & Plan  Patient is not currently taking any medications as BP is usually within normal limits  some low and high readings over  the last 24 hours, in the setting of illness, DARBY and IVF  monitor blood pressures, reasonable to consider starting a CCB if remains markedly elevated    Mixed hyperlipidemia  Assessment & Plan  Continue atorvastatin 20 mg nightly    Diabetes mellitus (CMS/[facility])  Assessment & Plan  Oral agents held while inpatient. Should resume on DC.       SUMMARY OF HOSPITALIZATION      Presenting Problem/History of Present Illness  This is a 78 y.o. year-old male admitted for UTI and incidentally positive SARS-CoV-2.    Hospital Course  This is a 78 y.o. year-old male visiting the area from [place] with a PMH significant for renal carcinoma status post left nephrectomy, prostate cancer, HTN, T2DM, SDH, multiple CVAs who was brought by his wife due to concern for altered mental status, nausea, vomiting and weaknessr and ultimately admitted for UTI and incidentally positive SARS-CoV-2.  Wife notes that presenting symptoms are consistent with his prior episodes of UTI.  UA is positive for 3+ bacteria.  CT abdomen pelvis shows solitary right kidney with hydronephrosis (likely chronic) metabolic panel notable for DARBY (sCr 2.0 on admission; approximately 1.5 at baseline according to wife).  He was evaluated by urology who noted acute intervention required at this time.  He was treated with IV ceftriaxone for approximately 4 days while admitted.  He will be discharged on 6 days of cefpodoxime to complete a total 10-day course.  He has remained asymptomatic in regards to his COVID infection but has been advised to maintain isolation precautions for at least 1 more day so long as he remains asymptomatic.  He is advised to follow-up with his primary urologist in [place] when he returns after the holidays.    He is now medically stable for discharge home in the care of his wife and family.    Exam on Day of Discharge  Physical Exam  Constitutional:       Appearance: Normal appearance. He is normal weight.   HENT:      Head:  Normocephalic and atraumatic.      Nose: Nose normal.      Mouth/Throat:      Mouth: Mucous membranes are moist.      Pharynx: Oropharynx is clear.   Cardiovascular:      Rate and Rhythm: Normal rate and regular rhythm.      Heart sounds: Normal heart sounds.   Pulmonary:      Effort: Pulmonary effort is normal.      Breath sounds: Normal breath sounds.   Abdominal:      General: Abdomen is flat.      Palpations: Abdomen is soft.   Musculoskeletal:      Cervical back: Normal range of motion and neck supple.   Skin:     General: Skin is warm and dry.   Neurological:      Mental Status: He is alert.       Consults During Admission  IP CONSULT TO UROLOGY    DISCHARGE MEDICATIONS        Medication List      START taking these medications    cefpodoxime 200 mg tablet  Commonly known as: VANTIN  Start taking on: December 24, 2023  Take 1 tablet (200 mg total) by mouth 2 (two) times a day for 6 days.  Dose: 200 mg        CHANGE how you take these medications    atorvastatin 20 mg tablet  Commonly known as: LIPITOR  Take 1 tablet (20 mg total) by mouth daily.  Dose: 20 mg  What changed:   · medication strength  · when to take this        CONTINUE taking these medications    aspirin 81 mg enteric coated tablet  Take 81 mg by mouth daily.  Dose: 81 mg     clopidogreL 75 mg tablet  Commonly known as: PLAVIX  Take 75 mg by mouth daily.  Dose: 75 mg     glyBURIDE 5 mg tablet  Commonly known as: DIABETA  Take 10 mg by mouth 2 (two) times a day with meals.  Dose: 10 mg     metFORMIN 1,000 mg tablet  Commonly known as: GLUCOPHAGE  Take 1,000 mg by mouth 2 (two) times a day with meals.  Dose: 1,000 mg     silodosin 8 mg capsule  Commonly known as: RAPAFLO  Take 8 mg by mouth every evening.  Dose: 8 mg                      PROCEDURES / LABS / IMAGING      Pertinent Imaging  CT CHEST WITHOUT IV CONTRAST    Result Date: 12/20/2023  IMPRESSION: Hydronephrotic right kidney which is tethered posteriorly to the posterior pararenal fascia  where there is scarring and soft tissue thickening that may be related to prior surgery and/or nephrostomy tube placement.  Caliber change is likely at the ureteropelvic junction which is likely distorted and narrowed--potentially related to stricturing from prior surgery or inflammation but not well evaluated on this unenhanced study. Nephrostogram (either via a percutaneous antegrade or retrograde approach) may be helpful for assessment. Left nephrectomy with multiple clips in the retroperitoneum. Dilated distal left ureteral segment. No small bowel obstruction. Large colonic stool burden. No acute disease seen in the chest. Scattered areas of endobronchial mucous plugging noted without focal consolidation. Severe coronary atherosclerosis. Additional findings as above.     CT ABDOMEN PELVIS WITHOUT IV CONTRAST    Result Date: 12/20/2023  IMPRESSION: Hydronephrotic right kidney which is tethered posteriorly to the posterior pararenal fascia where there is scarring and soft tissue thickening that may be related to prior surgery and/or nephrostomy tube placement.  Caliber change is likely at the ureteropelvic junction which is likely distorted and narrowed--potentially related to stricturing from prior surgery or inflammation but not well evaluated on this unenhanced study. Nephrostogram (either via a percutaneous antegrade or retrograde approach) may be helpful for assessment. Left nephrectomy with multiple clips in the retroperitoneum. Dilated distal left ureteral segment. No small bowel obstruction. Large colonic stool burden. No acute disease seen in the chest. Scattered areas of endobronchial mucous plugging noted without focal consolidation. Severe coronary atherosclerosis. Additional findings as above.       OUTPATIENT  FOLLOW-UP / REFERRALS / PENDING TESTS        Test Results Pending at Discharge  Unresulted Labs (From admission, onward)     Start     Ordered    12/21/23 0600  Basic metabolic panel  Daily       Question:  Release to patient  Answer:  Immediate   Start Status   12/24/23 0600 Scheduled   12/25/23 0600 Scheduled   12/26/23 0600 Scheduled   12/27/23 0600 Scheduled       12/20/23 2149                Important Issues to Address in Follow-Up  [  ] Complete antibiotics course as prescribed.   [  ] Follow up with your primary Urologist do discuss your recent admission and continued monitoring and care    DISCHARGE DISPOSITION AND DESTINATION      Disposition: Home   Destination: Home                          Code Status At Discharge: Full Code

## 2023-12-24 LAB
BACTERIA BLD CULT: NORMAL
BACTERIA BLD CULT: NORMAL